# Patient Record
Sex: MALE | Race: WHITE
[De-identification: names, ages, dates, MRNs, and addresses within clinical notes are randomized per-mention and may not be internally consistent; named-entity substitution may affect disease eponyms.]

---

## 2018-11-28 NOTE — CPEKG
Test Reason : OPEN

Blood Pressure : ***/*** mmHG

Vent. Rate : 080 BPM     Atrial Rate : 124 BPM

   P-R Int : 200 ms          QRS Dur : 086 ms

    QT Int : 399 ms       P-R-T Axes : 000 050 251 degrees

   QTc Int : 461 ms

 

Atrial fibrillation

LVH with secondary repolarization abnormality

Anterior Q waves, possibly due to LVH

 

No significant change from February 6, 2017.

Confirmed by Nabeel Lester ([id number]) on 11/28/2018 9:14:32 AM

 

Referred By:             Confirmed By:Nabeel Lester

## 2018-11-28 NOTE — CPIP
DATE OF PROCEDURE:  11/28/2018



INDICATIONS FOR PROCEDURE:  Severe symptomatic aortic stenosis.



PROCEDURES:  

1.  Nonselective left groin sheathogram.  

2.  Right heart catheterization with Meriden-Mansoor catheter. 

3.  Bilateral coronary angiography. 

4.  Abdominal aortogram.



HISTORY:  Briefly this is an 83-year-old male with history of worsening shortness of breath, known cr
itical aortic stenosis.  The patient has been seen by CT surgery and deemed to be a suitable candidat
e for transfemoral TAVR.



DESCRIPTION OF PROCEDURE:  After informed consent was obtained,the patient was brought to [facility] where the left groin was prepped and draped in the usual sterile fashion. Using Cloudscaling
ne, a short 6-Solomon Islander sheath in the left common femoral artery verified angiographically. Of note, the
 femoral angiogram showed severe tortuosity of the proximal common femoral artery, distal external il
iac artery.  The 7-Solomon Islander sheath was inserted into the left common vein.  A right heart catheterizati
on was then obtained with a Meriden-Mansoor catheter.  Wedge pressure was a mean of 12, AA of 14, VA 16, PA
 pressure systolic 31, diastolic 12, mean of 21.  RV pressure systolic 30, diastolic 1 end of 6.  RA 
pressure mean of 5, AA of 7, V-wave 6.  Cardiac output was measured to be 4.2 by Reece with a cardiac 
index of 2.4.  AO sat was 88%, with a PA sat of 63%.  Meriden-Mansoor catheter was removed.  



At this time we decided to change this 6-Solomon Islander sheath in the left groin to a long 25 cm sheath given
 the excessive tortuosity.  A 0.035 wire was gently advanced into the ascending aorta.  A JR4 cathete
r was then advanced across this wire.  Once this was performed, the wire was taken out.  An Amplatz s
uperstiff wire was then advanced. After the Amplatz superstiff wire was advanced, there was clear str
aightening of the vessel.  The short 6-Solomon Islander sheath was removed.  A 6-Solomon Islander 25 cm sheath was then p
laced into the left femoral artery access.  After this was performed, a JR4 catheter was then advance
d on the Amplatz superstiff wire to the right coronary artery.  Images of the right coronary artery r
evealed normal ostial RCA, normal ostia with just mild plaque in its proximal aspect.  Mid distal RCA
 was apparently healthy, free of disease.  RP and RPLS appeared to be healthy and free of disease. Af
ter these images were obtained.  The JR4 catheter was removed.  A JL4 catheter was advanced to the le
ft coronary artery.  Imaging of the left coronary artery revealed 20% ostial left main, with 30% dist
al left main.  The left circumflex artery was a large vessel, giving off multiple small marginal janes
smith were healthy and free of disease.  There was a ramus intermedius which was healthy and free of d
isease.  The LAD was a long vessel which wrapped around the apex.  The LAD had mid body area in the m
id portion of approximately 30% to 40% disease, distally appeared to be widely patent.  Small diagona
l arteries coming off the LAD appeared to be widely patent.  After the images were obtained, the JL4 
catheter was removed.  



A JR4 pigtail catheter advanced to the ascending aorta.  Abdominal aortogram was obtained which showe
d widely patent distal aorta, widely patent common external internal iliac arteries.  Of note, there 
was less tortuosity of the right common femoral takeoff compared to the left.  Pigtail catheter was r
emoved over an 0.035 wire.  Left groin was closed with manual pressure.  Patient tolerated the proced
ure well with no complications.



IMPRESSION:  

1.  Noncritical coronary artery disease mainly in the noncritical coronary disease mainly in the left
 main and mid left anterior descending area.  

2.  Normal right coronary artery.  

3.  Low normal cardiac output.

4.  Patent aortoiliac conduit with tortuous distal vessels.



PLAN:  The patient will have sheaths DC'd with manual pressure. Three hours bed rest. Will have CTs o
f the chest, abdomen, and pelvis, as well as carotid ultrasound.  Plan for TAVR in the next 2-3 weeks
.





Job #:  [id number]

## 2018-12-17 ENCOUNTER — HOSPITAL ENCOUNTER (INPATIENT)
Dept: HOSPITAL 80 - FCATH | Age: 83
LOS: 2 days | Discharge: HOME | DRG: 267 | End: 2018-12-19
Attending: INTERNAL MEDICINE | Admitting: INTERNAL MEDICINE
Payer: COMMERCIAL

## 2018-12-17 DIAGNOSIS — Z79.01: ICD-10-CM

## 2018-12-17 DIAGNOSIS — I10: ICD-10-CM

## 2018-12-17 DIAGNOSIS — I35.0: Primary | ICD-10-CM

## 2018-12-17 DIAGNOSIS — I48.0: ICD-10-CM

## 2018-12-17 DIAGNOSIS — Z00.6: ICD-10-CM

## 2018-12-17 DIAGNOSIS — Z94.4: ICD-10-CM

## 2018-12-17 LAB
INR PPP: 1.07 (ref 0.83–1.16)
PROTHROMBIN TIME: 14.1 SEC (ref 12–15)

## 2018-12-17 PROCEDURE — C1769 GUIDE WIRE: HCPCS

## 2018-12-17 PROCEDURE — 02RF38Z REPLACEMENT OF AORTIC VALVE WITH ZOOPLASTIC TISSUE, PERCUTANEOUS APPROACH: ICD-10-PCS | Performed by: INTERNAL MEDICINE

## 2018-12-17 PROCEDURE — C1894 INTRO/SHEATH, NON-LASER: HCPCS

## 2018-12-17 PROCEDURE — C1760 CLOSURE DEV, VASC: HCPCS

## 2018-12-17 RX ADMIN — PANTOPRAZOLE SODIUM SCH MG: 40 TABLET, DELAYED RELEASE ORAL at 19:52

## 2018-12-17 RX ADMIN — TACROLIMUS SCH MG: 1 CAPSULE ORAL at 19:52

## 2018-12-17 RX ADMIN — METOPROLOL TARTRATE SCH MG: 50 TABLET, FILM COATED ORAL at 19:52

## 2018-12-17 RX ADMIN — WARFARIN SODIUM SCH MG: 2.5 TABLET ORAL at 16:37

## 2018-12-17 NOTE — PDANEPAE
ANE History of Present Illness





82 yo for tavr





ANE Past Medical History





- Cardiovascular History


Hx Hypertension: Yes


Hx Arrhythmias: Yes


Hx Chest Pain: No


Hx Coronary Artery / Peripheral Vascular Disease: Yes


Hx CHF / Valvular Disease: Yes


Hx Palpitations: No





- Pulmonary History


Hx COPD: No


Hx Asthma/Reactive Airway Disease: No


Hx Recent Upper Respiratory Infection: No


Hx Oxygen in Use at Home: No


Hx Sleep Apnea: No





- Endocrine History


Hx Diabetes: No





ANE Review of Systems


Review of Systems: 








- Exercise capacity


METS (RN): 3 METS





ANE Patient History





- Allergies


Allergies/Adverse Reactions: 








No Known Allergies Allergy (Verified 11/21/18 09:35)


 








- Home Medications


Home medications: home medication list seen and reviewed


Home Medications: 








Cyanocobalamin [Vitamin B12 (*)] 1,000 mcg PO DAILY 02/06/17 [Last Taken 12/16/ 18 08:00]


FLUDROCORTISONE ACETATE 0.1 mg PO DAILY 02/06/17 [Last Taken 12/16/18 08:00]


Levothyroxine [Synthroid 88 mcg (*)] 88 mcg PO DAILY06 02/06/17 [Last Taken 12/ 16/18 06:00]


Metoprolol Tartrate [Lopressor 50 mg (*)] 50 mg PO BID 02/06/17 [Last Taken 12/ 16/18 17:00]


Tacrolimus [Prograf] 1 mg PO BID 02/06/17 [Last Taken 12/16/18 17:00]


Carbamide Peroxide [Debrox Ear drops (*)] 5 drop EACHEAR BID PRN 11/21/18 [Last 

Taken 11/28/18]


Lisinopril [Zestril 10 mg (*)] 10 mg PO DAILY 11/21/18 [Last Taken 12/16/18 08:

00]


Polyethylene Glycol 3350 [Miralax 17 gm (*)] 17 gm PO DAILY 11/21/18 [Last 

Taken 12/16/18 08:00]


Warfarin Sodium [Coumadin 2.5MG (*)] 2.5 mg PO DAILY16 11/21/18 [Last Taken 12/ 12/18 16:00]


amLODIPine BESYLATE [Norvasc 10 mg (*)] 10 mg PO DAILY 11/21/18 [Last Taken 12/ 16/18 08:00]








- NPO status


NPO Status: no food or drink >8 hours





- Anes Hx


Anes Hx: no prior problems





- Smoking Hx


Smoking Status: Never smoked





ANE Labs/Vital Signs





- Vital Signs


Height: 5 ft 6.93 in


Weight: 63.5 kg





ANE Physical Exam





- Airway


Neck exam: FROM


Mallampati Score: Class 2


Mouth exam: normal dental/mouth exam





- Pulmonary


Pulmonary: no respiratory distress





- Cardiovascular


Cardiovascular: regular rate and rhythym





- ASA Status


ASA Status: III





ANE Anesthesia Plan


Anesthesia Plan: MAC


Lines/Monitors: central line

## 2018-12-17 NOTE — GOP
DATE OF OPERATION:  12/17/2018



SURGEON:  Elliot Liu MD



PREOPERATIVE DIAGNOSIS:  Severe symptomatic aortic stenosis.



POSTOPERATIVE DIAGNOSIS:  Severe symptomatic aortic stenosis.



PROCEDURE PERFORMED:  Transcatheter aortic valve replacement using a 34 mm Evolut R device via right 
femoral approach.



FINDINGS:  





INDICATIONS:  The patient is an 83-year-old gentleman with progressive dyspnea on exertion.  He was f
ound to have severe symptomatic aortic stenosis.  He has normal coronaries.  He was recommended to [facility] transcatheter aortic valve replacement.



DESCRIPTION OF PROCEDURE:  Patient was taken to the cath lab, placed on the table in the supine posit
ion.  After administration of monitored anesthesia and sterile prep and drape and IV antibiotics, we 
began by anesthetizing both groins.  Ultimately, the 16-Nauruan sheath was placed on the right side pe
rcutaneously with two pre-close devices having been deployed prior to sheath insertion.  On the left 
side, 8-Nauruan sheath was also placed for monitoring blood pressure and positioning of the pigtail.  
Once the sheaths were in place, TVT was tested and found to be functioning properly.  We then heparin
ized the patient and then crossed the valve.  With myself in position 1 and Dr. Kamara in position 2
, we deployed the 34 mm valve across the native valve while pacing at a rate of approximately 100.  W
e did pre-dilate this because of the heavy calcification of the valve.  Once the valve was deployed, 
we were quite happy with the depth.  The leak was mild on echo, and the valve was next fully deployed
.  The device and sheath were then removed from the right side, and these were closed using the pre-c
lose technique, and the 8-Nauruan sheath was also removed and closed with a closure device.  The prota
mine was administered.  The patient tolerated the procedure well and was returned to recovery area in
 stable condition.



CARDIOLOGIST:  Keith Kamara MD.





Job #:  [id number]

## 2018-12-17 NOTE — PDMN
Medical Necessity


Medical necessity: MCG  aortic valve replacement, transcatheter  3 days  

[facility] INPT only  OP: transfemoral TAVR

## 2018-12-17 NOTE — CPIP
DATE OF PROCEDURE:  12/17/2018



CO-SURGEONS:  Dr. Elliot Liu, Dr. Sarah Jenkins, Dr. James Mae.



PROCEDURES:  

1.  Nonselective left groin sheathogram.

2.  Nonselective right groin sheathogram.

3.  Balloon aortic valvuloplasty using 23 x 60 balloon.

4.  Placement of Medtronic CoreValve Evolute R 34 mm valve via the transfemoral route.



HISTORY:  Briefly, this is an 83-year-old male with history of critical aortic symptomatic aortic lakshmi
nosis.  The patient was deemed to be a suitable candidate for transfemoral TAVR by 2 separate CT surg
eons.



DESCRIPTION OF PROCEDURE:  After informed consent, the patient was brought to [facility] where the patient w
as sedated with MAC.  The patient was administered 2 g of Ancef prior to the case.  Right IJ line was
 placed with temporary pacemaker wire placed in right ventricle and capture being assessed.  8-Niuean
 sheath in the left common femoral artery verified angiographically.  6-Niuean sheath in the right fe
moral artery verified angiographically, upsized to a 16-Niuean sheath after double Perclose placement
.  



Valve was crossed with AL1 catheter, straight stiff Glidewire, switched out for a Confida wire.  At t
his time, a balloon aortic valvuloplasty commenced with a 23 x 60 balloon.  After this was performed 
under a pacing rate of 180 beats per minute, the balloon was removed.  We then proceeded with placeme
nt of a Medtronic CoreValve Evolute 34 R.  This was deployed successfully.  



After deployment, there was noted to be mild AI; however, given the fact that the deployment was slig
htly high, we decided that prior to doing any BAV, we would actually put a pigtail back into the LV f
or simultaneous hemodynamics. The hemodynamics showed virtually no gradient between the LV and FA wit
h only an EDP of approximately 60 mmHg.  Given this low EDP, as well as the excellent gradient that w
as present, we decided no post BAV would be needed.  The pigtail catheter was removed over the 035 wi
re.  The right groin was closed with double Perclose.  The left groin was closed with an 8-Niuean Ang
io-Seal.  Patient tolerated the procedure well with no complications.



IMPRESSION:  Successful placement of Medtronic CoreValve Evolute R 34 by the transfemoral route.



PLAN:  The patient will be admitted to PCU.  Further orders following clinical course.





Job #:  [id number]

## 2018-12-17 NOTE — PDPROPOC
Sedation Plan of Care


Sedation Plan of Care: mental status noted, patient educated of risks, benefits

, alternatives, patient can tolerate sedation


ASA Classification: ASA 2


Planned drugs: other


Mallampati Score: Class 2


Mallampati Reference Image: 





Patient passed 3-3-2 rule?: Yes

## 2018-12-18 LAB
INR PPP: 1.2 (ref 0.83–1.16)
PLATELET # BLD: 151 10^3/UL (ref 150–400)
PROTHROMBIN TIME: 15.4 SEC (ref 12–15)

## 2018-12-18 RX ADMIN — TACROLIMUS SCH MG: 1 CAPSULE ORAL at 08:57

## 2018-12-18 RX ADMIN — LISINOPRIL SCH MG: 10 TABLET ORAL at 08:57

## 2018-12-18 RX ADMIN — TACROLIMUS SCH MG: 1 CAPSULE ORAL at 20:10

## 2018-12-18 RX ADMIN — METOPROLOL TARTRATE SCH MG: 50 TABLET, FILM COATED ORAL at 08:57

## 2018-12-18 RX ADMIN — FLUDROCORTISONE ACETATE SCH MG: 0.1 TABLET ORAL at 08:57

## 2018-12-18 RX ADMIN — PANTOPRAZOLE SODIUM SCH MG: 40 TABLET, DELAYED RELEASE ORAL at 20:10

## 2018-12-18 RX ADMIN — POLYETHYLENE GLYCOL 3350 SCH: 17 POWDER, FOR SOLUTION ORAL at 08:58

## 2018-12-18 RX ADMIN — PANTOPRAZOLE SODIUM SCH MG: 40 TABLET, DELAYED RELEASE ORAL at 08:57

## 2018-12-18 RX ADMIN — CYANOCOBALAMIN TAB 1000 MCG SCH MCG: 1000 TAB at 08:57

## 2018-12-18 RX ADMIN — LEVOTHYROXINE SODIUM SCH MCG: 0.09 TABLET ORAL at 05:09

## 2018-12-18 RX ADMIN — WARFARIN SODIUM SCH MG: 2.5 TABLET ORAL at 17:22

## 2018-12-18 RX ADMIN — METOPROLOL TARTRATE SCH MG: 50 TABLET, FILM COATED ORAL at 20:10

## 2018-12-18 NOTE — ECHO
[email or web address]





[address] [address] 

Main: [phone] 



Fax: 



Transthoracic Echocardiogram 

Name:            SILVIANO VILLALBA                             MR#:

[id number]

Study Date:      2018                            Study Time:

10:01 AM

YOB: 1935                            Age:

83 year(s)

Height:            ( )                                 Weight:

( )

BSA:                                                   Gender:

Male

Examination:     Limited Echo                          Indication:

TAVR

Image Quality:                                         Contrast: 

Requested by:    Keith Kamara                      BP:

/

Heart Rate:                                            Rhythm: 

Indication:      TAVR 



Procedure Staff 

Ultrasound Technician:   Kristen Prasad RDCS 

Reading Physician:       Sarah Jenkins MD 

Requesting Provider:     Keith Kamara 



Conclusions:          Normal global systolic LV function.  

Post TAVR: AV max PG is 12mmHG. AV mean PG is 5mmHG. JUAN DIEGO 2.53cm. 2

separate jets of

mild/moderate AI..  

No pericardial effusion. 



Measurements: 

Chambers                   Valvular Assessment AV/MV          Valvular

Assessment TV/PV



Normal                                Normal

Normal

Name         Value   Range             Name        Value Range

Name          Value Range

LVOTd        2.3 cm  2.3 cm mm             AV meanP mmHg ( - )  



JUAN DIEGO (VTI):  2.4 cm ( - )  

AR (PHT):   606 ms ( - )  



Continued Measurements: 

Valvular Assessment AV/MV  



Name                   Value  

AR Vmax:             2.44 cm/s   



Findings:             Left Ventricle: 

Mild concentric LV hypertrophy. Normal global systolic LV function.  

Mitral Valve: 

Mild mitral valve regurgitation is present.  

Aortic Valve: 

Post TAVR: AV max PG is 12mmHG. AV mean PG is 5mmHG. JUAN DIEGO 2.53cm. 2

separate jets of

mild/moderate AI..  

Pericardium: 



Patient: SILVIANO VILLALBA                          MRN: [id number]

Study Date: 2018   Page 1 of 2

10:01 AM 









No pericardial effusion.  

Exam Comments: 

There was successful implantation of the percutaneous core valve in

the aortic position. There is no

compromise of the mitral valve. There is preserved LV systolic

function. .







Electronically signed by Sarah Jenkins MD on 2018 at 04:27 PM 

(No Signature Object) 



Patient: SILVIANO VILLALBA                          MRN: [id number]

Study Date: 2018   Page 2 of 2

10:01 AM 







[id number]

## 2018-12-18 NOTE — ASMTCMCOM
CM Note

 

CM Note                       

Notes:

Spoke with pt's RN, pt and pt's son in the room. Pt lives independently at [facility].  Pt 

admitted for TAVR. Therapy evals pending. Pt would like to discharge home to son's home for a day 

or two and then back to his apartment independently. CM to follow. 



D/C Plan: Home with son independently v [facility]

 

Date Signed:  12/18/2018 02:39 PM

Electronically Signed By:Roxanne Robledo

## 2018-12-18 NOTE — PDCARPN
Cardiology Progress Note


Chief Complaint: 





SOB


Assessment/Plan: 


Assessment:


s/p TAVR























Plan:





12/18/18 06:58


doing well


OOB


IS


check echo


Subjective: 





doing well


Reviewed/Discussed With: multidisciplinary team


Time Spent with Patient: greater than 25 minutes


Time Spent with Patient: Greater than 25 minutes spent on this patients care, 

greater than 50% of time spent counseling, educating, and coordinating care 

regarding the above mentioned plan.


Objective: 





 Vital Signs (8 Hrs)











  Temp Pulse Resp BP Pulse Ox


 


 12/18/18 04:22  36.9 C  79  14  141/65 H  94


 


 12/17/18 22:59  37.2 C  72  12  135/81 H  95








 Intake/Output (24 Hrs)











 12/17/18 12/18/18 12/19/18





 05:59 05:59 05:59


 


Intake Total  1490 


 


Output Total  1075 


 


Balance  415 


 


Intake:   


 


  Oral (ml)  490 


 


  IV Intake (ml)  1000 


 


Output:   


 


  Urine (ml)  1075 


 


    Urinal  1075 


 


Other:   


 


  Weight  63.5 kg 


 


  Number of Voids   


 


    Urinal  2 











Result Diagrams: 


 12/18/18 03:36





 12/18/18 03:36





- Physical Exam


Constitutional: healthy appearing


Eyes: PERRL


Ears, Nose, Mouth, Throat: moist mucous membranes


Cardiovascular: irregularly irregular


Peripheral Pulses: 1+: femoral (R), femoral (L)


Respiratory: clear to auscultate bilat


Gastrointestinal: normoactive bowel sounds


Genitourinary: no suprapubic tenderness


Skin: no rashes


Musculoskeletal: no muscular tenderness


Neurologic: AAOx3


Psychiatric: cooperative





ICD10 Worksheet


Patient Problems: 


 Problems











Problem Status Onset


 


Anemia due to GI blood loss Acute  


 


Upper GI bleed Acute  


 


Weakness Acute

## 2018-12-18 NOTE — ECHO
[email or web address]





[address] [address] 

Main: [phone] 



Fax: 



Transthoracic Echocardiogram 

Name:             SILVIANO VILLALBA                                MR#:

[id number]

Study Date:       2018                               Study Time:

10:05 AM

YOB: 1935                               Age:

83 year(s)

Height:           167.6 cm (66 in.)                        Weight:

63.05 kg (139 lb.)

BSA:              1.71 m2                                  Gender:

Male

Examination:      Echo                                     Indication:

Post TAVR

Image Quality:                                             Contrast: 

Requested by:     Keith Kamara                         BP:

104 mmHg/70 mmHg

Heart Rate:                                                Rhythm: 

Indication:       Post TAVR 



Procedure Staff 

Ultrasound Technician:   Ford Olmstead RDCS 

Reading Physician:       Keith Kamara MD 

Requesting Provider: 



Conclusions:           Mild concentric LV hypertrophy.  

Global hypercontractility of the left ventricle.  

EF is 72 %.  

Mild mitral valve regurgitation is present.  

There is a percutaneous CoreValve in the aortic position. There are

two jets of mild AI. There is no

compromise of the mitral valve with normal EF .  

Mild to moderate tricuspid valve regurgitation. 



Measurements: 

Chambers                     Valvular Assessment AV/MV

Valvular Assessment TV/PV



Normal                                    Normal

Normal

Name         Value      Range              Name         Value Range

Name           Value Range

IVSd (2D):   1.0 cm (0.6 cm-1.1                AV Vmax:     1.40 m/s

(1 m/s-1.7        TR Vmax:       3.02 mm/s ( - )



cm)                                   m/s)             TR PGmax:

36 mmHg ( - )

LVDd (2D):   4.3 cm     (4.2 cm-5.9            AV maxP mmHg ( -

)           syst. PAP: 41 mmHg   ( - )



cm)                AV meanP mmHg ( - )           PV Vmax:

0.78 m/s (0.6 m/s-0.9

LVDs (2D):   2.5 cm     (2.1 cm-4              LVOT Vmax:   1.03 m/s

(0.7 m/s-1.1                           m/s)



cm)                                   m/s)             PV PGmax:

2  mmHg ( - )

LVPWd (2D):  1.0 cm     (0.6 cm-1              JUAN DIEGO (Vmax):  3.1 cm2 (

- )



cm)                JUAN DIEGO (VTI):   3.5 cm ( - )  

LVOTd        2.3 cm     2.3 cm mm              AR (PHT):    569 ms ( -

)

LVEF (2D):   72         (>=54 %)           MV E Vmax:   1.17 m/s ( - )





Continued Measurements: 

Chambers                     Valvular Assessment AV/MV

Valvular Assessment TV/PV



Name                     Value             Name

Value      Name                     Value

LA Volume:              80 ml              MV E/E' Septal:

23.10      CVP (est.):             5 mmHg

LA Volume Index:        46.8 ml/m2         MV E/E' Lateral:

18.50



Patient: SILVIANO VILLALBA                            MRN: [id number]

Study Date: 2018   Page 1 of 2

10:05 AM 









AR Vmax: 2.47 cm/s   



Findings:              Left Ventricle: 

Normal size left ventricle. Mild concentric LV hypertrophy. Global

hypercontractility of the left

ventricle. EF is 72 %. No regional wall motion abnormality. Grade 1

diastolic dysfunction (abnormal

relaxation).  

Right Ventricle: 

Normal size right ventricle. Normal RV function.  

Left Atrium: 

The left atrium is moderately to severely dilated.  

Right Atrium: 

The right atrium is moderately dilated.  

Mitral Valve: 

The mitral valve is normal in appearance. Mild mitral valve

regurgitation is present.

Aortic Valve: 

There is a percutaneous CoreValve in the aortic position. There are

two jets of mild AI. There is no

compromise of the mitral valve with normal EF .  

Tricuspid Valve: 

The tricuspid valve appears normal. Mild to moderate tricuspid valve

regurgitation. The pulmonary

artery pressure is mildly increased.  

Pulmonic Valve: 

The pulmonic valve is normal in appearance and function.  

Aorta: 

The aorta is normal.  

Pericardium: 

No pericardial effusion. 







Electronically signed by Keith Kamara MD on 2018 at 12:33 PM



(No Signature Object) 



Patient: SILVIANO VILLALBA                            MRN: [id number]

Study Date: 2018   Page 2 of 2

10:05 AM 







[id number]

## 2018-12-19 VITALS — SYSTOLIC BLOOD PRESSURE: 136 MMHG | DIASTOLIC BLOOD PRESSURE: 69 MMHG

## 2018-12-19 LAB
INR PPP: 1.25 (ref 0.83–1.16)
PLATELET # BLD: 113 10^3/UL (ref 150–400)
PROTHROMBIN TIME: 15.9 SEC (ref 12–15)

## 2018-12-19 RX ADMIN — PANTOPRAZOLE SODIUM SCH MG: 40 TABLET, DELAYED RELEASE ORAL at 09:34

## 2018-12-19 RX ADMIN — LEVOTHYROXINE SODIUM SCH MCG: 0.09 TABLET ORAL at 03:53

## 2018-12-19 RX ADMIN — LISINOPRIL SCH MG: 10 TABLET ORAL at 09:34

## 2018-12-19 RX ADMIN — POLYETHYLENE GLYCOL 3350 SCH GM: 17 POWDER, FOR SOLUTION ORAL at 09:33

## 2018-12-19 RX ADMIN — FLUDROCORTISONE ACETATE SCH MG: 0.1 TABLET ORAL at 10:48

## 2018-12-19 RX ADMIN — CYANOCOBALAMIN TAB 1000 MCG SCH MCG: 1000 TAB at 10:48

## 2018-12-19 RX ADMIN — METOPROLOL TARTRATE SCH MG: 50 TABLET, FILM COATED ORAL at 10:48

## 2018-12-19 RX ADMIN — TACROLIMUS SCH MG: 1 CAPSULE ORAL at 09:34

## 2018-12-19 NOTE — GDS
DISCHARGE DIAGNOSIS:  Aortic stenosis.



HOSPITAL COURSE:  Briefly, this is 83-year-old male with history of liver transplant, severe symptoma
tic aortic stenosis, atrial fibrillation who was admitted electively for transfemoral TAVR on 12/17/2
018.  The patient underwent successful to transfemoral TAVR on 12/17/2018, with Medtronic CoreValve E
volut 34 mm valve.  



Postprocedure, the patient has done very well.  Echocardiogram post-procedure shows normally function
ing EF with normally functioning valve with mild paravalvular leak.  The patient will be discharged h
ome this morning with his home medications, including his home Coumadin dose.  Given the fact the pat
ient is subtherapeutic still, we will start the patient on Lovenox 60 mg b.i.d. for 4 doses as a brid
ge.  The patient will follow up in the office in approximately 1 week's time.





Job #:  [id number]

## 2018-12-19 NOTE — CPEKG
Test Reason : OPEN

Blood Pressure : ***/*** mmHG

Vent. Rate : 075 BPM     Atrial Rate : 000 BPM

   P-R Int : 052 ms          QRS Dur : 098 ms

    QT Int : 386 ms       P-R-T Axes : 000 017 229 degrees

   QTc Int : 432 ms

 

Atrial fibrillation

LVH with secondary repolarization abnormality

Anterior Q waves, possibly due to LVH

 

Confirmed by Dean Lynn ([id number]) on 12/19/2018 1:54:11 PM

 

Referred By:             Confirmed By:Dean yLnn

## 2018-12-19 NOTE — PDCARPN
Cardiology Progress Note


Chief Complaint: 





SOB


Assessment/Plan: 


Assessment:


s/p TAVR























Plan:





12/18/18 06:58


doing well


OOB


IS


check echo


12/19/18 07:38


Stable


OOB


d/c home today


f/u next week


lovenox bridge





Subjective: 





doing well


Reviewed/Discussed With: multidisciplinary team


Time Spent with Patient: greater than 25 minutes


Time Spent with Patient: Greater than 25 minutes spent on this patients care, 

greater than 50% of time spent counseling, educating, and coordinating care 

regarding the above mentioned plan.


Objective: 





 Vital Signs (8 Hrs)











  Temp Pulse Resp BP Pulse Ox


 


 12/19/18 07:11  36.6 C  86  12  136/69 H  93


 


 12/19/18 04:00  37.1 C  79  16  117/70  95


 


 12/19/18 00:00  37.2 C  76  14  120/68  92








 Intake/Output (24 Hrs)











 12/18/18 12/19/18 12/20/18





 05:59 05:59 05:59


 


Intake Total 1490 1170 


 


Output Total 1075 100 


 


Balance 415 1070 


 


Intake:   


 


  Oral (ml) 490 1170 


 


  IV Intake (ml) 1000  


 


Output:   


 


  Urine (ml) 1075 100 


 


    Urinal 1075 100 


 


Other:   


 


  Weight 63.5 kg  


 


  Number of Voids   


 


    Toilet  2 


 


    Urinal 2  


 


  Number of Stools   


 


    Toilet  1 











Result Diagrams: 


 12/19/18 03:55





 12/19/18 03:55





- Physical Exam


Constitutional: no apparent distress


Eyes: PERRL


Ears, Nose, Mouth, Throat: moist mucous membranes


Cardiovascular: systolic murmur, irregularly irregular


Peripheral Pulses: 1+: femoral (R), femoral (L)


Respiratory: clear to auscultate bilat


Gastrointestinal: normoactive bowel sounds


Genitourinary: no suprapubic tenderness


Skin: no rashes


Musculoskeletal: no muscular tenderness


Neurologic: AAOx3


Psychiatric: cooperative





ICD10 Worksheet


Patient Problems: 


 Problems











Problem Status Onset


 


Anemia due to GI blood loss Acute  


 


Upper GI bleed Acute  


 


Weakness Acute

## 2018-12-27 ENCOUNTER — HOSPITAL ENCOUNTER (OUTPATIENT)
Dept: HOSPITAL 80 - FED | Age: 83
Setting detail: OBSERVATION
LOS: 2 days | Discharge: HOME HEALTH | End: 2018-12-29
Attending: INTERNAL MEDICINE | Admitting: INTERNAL MEDICINE
Payer: COMMERCIAL

## 2018-12-27 DIAGNOSIS — I25.10: ICD-10-CM

## 2018-12-27 DIAGNOSIS — I12.9: ICD-10-CM

## 2018-12-27 DIAGNOSIS — Z66: ICD-10-CM

## 2018-12-27 DIAGNOSIS — Z95.2: ICD-10-CM

## 2018-12-27 DIAGNOSIS — N18.9: ICD-10-CM

## 2018-12-27 DIAGNOSIS — I48.2: ICD-10-CM

## 2018-12-27 DIAGNOSIS — Z94.4: ICD-10-CM

## 2018-12-27 DIAGNOSIS — Z79.01: ICD-10-CM

## 2018-12-27 DIAGNOSIS — Z98.890: ICD-10-CM

## 2018-12-27 DIAGNOSIS — E03.9: ICD-10-CM

## 2018-12-27 DIAGNOSIS — I50.30: Primary | ICD-10-CM

## 2018-12-27 DIAGNOSIS — I35.1: ICD-10-CM

## 2018-12-27 DIAGNOSIS — R53.83: ICD-10-CM

## 2018-12-27 DIAGNOSIS — Z87.19: ICD-10-CM

## 2018-12-27 LAB
INR PPP: 1.68 (ref 0.83–1.16)
PLATELET # BLD: 203 10^3/UL (ref 150–400)
PROTHROMBIN TIME: 19.9 SEC (ref 12–15)

## 2018-12-27 PROCEDURE — 99285 EMERGENCY DEPT VISIT HI MDM: CPT

## 2018-12-27 PROCEDURE — G0378 HOSPITAL OBSERVATION PER HR: HCPCS

## 2018-12-27 PROCEDURE — 96374 THER/PROPH/DIAG INJ IV PUSH: CPT

## 2018-12-27 PROCEDURE — 96376 TX/PRO/DX INJ SAME DRUG ADON: CPT

## 2018-12-27 PROCEDURE — 71045 X-RAY EXAM CHEST 1 VIEW: CPT

## 2018-12-27 PROCEDURE — 70450 CT HEAD/BRAIN W/O DYE: CPT

## 2018-12-27 PROCEDURE — 93990 DOPPLER FLOW TESTING: CPT

## 2018-12-27 PROCEDURE — 93005 ELECTROCARDIOGRAM TRACING: CPT

## 2018-12-27 PROCEDURE — 97116 GAIT TRAINING THERAPY: CPT

## 2018-12-27 PROCEDURE — 97161 PT EVAL LOW COMPLEX 20 MIN: CPT

## 2018-12-27 PROCEDURE — 93306 TTE W/DOPPLER COMPLETE: CPT

## 2018-12-27 RX ADMIN — TACROLIMUS SCH MG: 1 CAPSULE ORAL at 20:33

## 2018-12-27 RX ADMIN — PANTOPRAZOLE SODIUM SCH MG: 40 TABLET, DELAYED RELEASE ORAL at 20:33

## 2018-12-27 NOTE — GHP
DATE OF ADMISSION:  12/27/2018



CHIEF COMPLAINT:  Shortness of breath and fatigue over the last week.



HISTORY OF PRESENT ILLNESS:  The patient is an 83-year-old male who is known to 
our practice.  His primary cardiologist is Dr. Bret Maza, his structural 
heart cardiologist is Dr. Kamara.  Patient with significant past history that 
includes chronic atrial fibrillation, chronic renal insufficiency, severe 
aortic stenosis in which he underwent a TAVR procedure on December 17th of this 
year, discharged on December 19th (Medtronic CoreValve Evolut R 34 mm), history 
of liver transplant, long-acting immuno/anti-rejection medication, and 
hypertension.  The patient was brought in today by his son, patient stating 
that over the last week, since hospital discharge, he has been noticing mild 
shortness of breath, feels that this has worsened as time progressed.  He has 
also noted occasional episodes of lightheadedness with blurred vision that 
happen spontaneously and then dissipates within a few minutes, usually 
happening when he is standing.  He reports no chest pain or pressure.  
Reporting no recent fevers, chills, or night sweats.  Does state some mild 
orthopnea, but denies any PND or edema.  Reports no palpitations, near syncope, 
or syncopal events.  Denies any symptoms suggestive of TIA or CVA.  Reports no 
hematemesis.  Reports no bleeding issues.  Patient does report ongoing pain at 
bilateral groin site, catheter insertion sites at both groins, but states he 
has had no signs of infection and no drainage.  Due to his ongoing shortness of 
breath, his son did call our office today, and was told to come to the ER for 
further evaluation.  Upon arrival, electrocardiogram was done, noting that the 
patient was in atrial fibrillation while rate controlled, possible LVH.  A 
chest x-ray was also done which did note mild CHF with fluid overload and small 
bilateral pleural effusions.  Laboratory studies were drawn, showing mild anemia
, but not significantly changed from his discharge from TAVR.  He was also 
noted to have a proBNP of 11,400.  Negative troponin at 0.03.



PAST MEDICAL HISTORY:  Includes chronic atrial fibrillation, carotid 
arthrosclerosis, non-flow limiting CAD based off cardiac catheterization done 
in November of this year, chronic renal insufficiency, history of liver 
transplant, hypertension, hypothyroidism, history of GI bleed, significant 
hospitalization for sepsis in 2013, and valvular heart disease, as mentioned 
above.



PAST SURGICAL HISTORY:  Includes liver transplant in July of 2013, multiple ____
______, small-bowel obstruction, and TAVR procedure done on December 17, 2018, 
with a Medtronic CoreValve Evolut R 34 mm implantation.



FAMILY HISTORY:  Patient with reported family history of hypertension.



SOCIAL HISTORY:  He does not use any alcohol.  He lives independently in 
[place].  His son lives locally, in [place].  He is retired.  He has never 
smoked.  He denies any illicit drugs.  He is a .



ALLERGIES:  No known drug allergies.



HOME MEDICATIONS:  Include amlodipine 10 mg p.o. daily, warfarin 2.5 mg p.o. 
daily, Prograf 1 mg p.o. b.i.d., MiraLAX 17 g p.o. daily, Protonix 40 mg p.o. 
b.i.d., metoprolol tartrate 50 mg p.o. b.i.d., lisinopril 10 mg p.o. daily, 
synthroid 88 mcg p.o. daily, __________ 0.1 mg p.o. daily, vitamin B12 at 1000 
mg p.o. daily, Debrox ear drops 5 drops each ear b.i.d. p.r.n.



REVIEW OF SYSTEMS:  A 10-point review of systems done on patient.  All negative 
except as mentioned above.



PHYSICAL EXAMINATION:  GENERAL:  Thin, elderly  male.  He is alert and 
oriented to person, place, time, and situation.  Appears to be under no acute 
distress.  VITAL SIGNS:  Blood pressure of 139/60, heart rate of 70, atrial 
fibrillation on the monitor.  Respirations 18, saturating 90% on room air.  
Temperature of 37.1 degrees Celsius.  HEENT:  Head is normocephalic.  Lips and 
tongue are pink and moist with no signs of cyanosis.  Conjunctivae pink.  NECK:
  Trachea is midline, +2 carotid pulses bilateral, no auscultated bruits.  JVD 
at 5 to 6 cm above the sternal notch at a 45 degree angle.  RESPIRATORY:  Mild 
rales noted in bases bilateral, no rhonchi or wheezing noted.  No accessory 
muscle use.  No intercostal muscle retraction noted.  CARDIAC:  Regular rate, 
irregular rhythm.  S1, S2.  2/6 systolic murmur noted left sternal border.  
ABDOMEN:  Soft, nontender, bowel sounds x4 quadrants, no organomegaly, no 
palpable masses.  SKIN:  Pink, warm, dry, no cyanosis, no peripheral edema.  
VASCULAR:  +2 carotids bilateral, +2 radials bilateral, +2 dorsal pedal and 
posterior tibial pulses bilateral.



DATA REVIEWED:  Laboratories drawn on admission showing WBC of 5.57, hemoglobin 
of 11.4, hematocrit of 34.1, platelet count of 203.  INR noted to be 
subtherapeutic at 1.68.  Sodium 141, potassium 4.1, chloride 112, CO2 of 20, 
BUN 36, creatinine 1.6, glucose 167, calcium 9.3.  Troponin 0.03, proBNP 11,
400. 



Studies:  Electrocardiogram as mentioned above.  Chest x-ray as mentioned 
above.  



Echocardiogram was done showing normal LV size, mild concentric LVH.  EF 
estimated between 70% to 75% with no regional wall motion abnormalities.  
Diastolic dysfunction was present.  LA was moderately to severely dilated.  
Percutaneous core valve in aortic position.  There were 2 AI jets with a 
maximum peak gradient of 8 mmHg and a mean gradient of 4 mmHg.  Mild-to-
moderate AI, mild to moderate TR.  RVSP of 44 mmHg.  No pericardial effusion.



ASSESSMENT/PLAN:  

1.  Diastolic heart failure:  Patient reporting gradual onset of shortness of 
breath over the last 2 weeks.  He has had no significant weight gain, but shows 
up with significant JVD, chest x-ray showing mild CHF with mild pleural 
effusions.  Elevated BNP, Rales noted in bases bilateral on physical 
examination.  At this time, we will admit him for IV diuresis and monitor him 
closely.  Daily ins and outs.  Daily weights.  Initial order of IV Lasix 
ordered in the ER for 40 mg.  

2.  Episodes of lightheadedness:  The patient has been noted to be orthostatic 
in the past.  Will check orthostatic blood pressures upon arrival to the PCU.  
The patient was also noted at times to have heart rates drop down to 30 beats 
per minute in atrial fibrillation, concerning that may be potentially due to 
significant pauses due to his atrial fibrillation.  Would like to decrease his 
beta-blocker dosage of metoprolol, home dosage, to 37.5 mg p.o. b.i.d. 

3.  Chronic atrial fibrillation:  He is currently rate controlled.  As 
mentioned above, pauses noted.  Will decrease metoprolol home dosage to 37.5 mg 
p.o. b.i.d.  He is on systemic anticoagulation of warfarin.  He is 
subtherapeutic.  Will give him an additional dose of warfarin today, will 
monitor INR closely.  

4.  Coronary artery disease, recent cardiac catheterization showing non-flow 
limiting disease, mostly in left main and left anterior descending, done in 
November of this year.  He is currently not on antiplatelet therapy due to 
being on full anticoagulation.  He is also not on statin therapy due to history 
of liver transplant.  Will continue to work on risk reduction with blood 
pressure management.

5.  History of liver transplant:  Will plan on getting a liver profile in the 
a.m., continue on home dose of Prograf.

6.  Hypertension:  Blood pressure appears fairly well-controlled on current 
dose of metoprolol and lisinopril.  No changes to lisinopril dosage from home.  
Reduce metoprolol dosage as mentioned above.  Continue to monitor, adjust as 
needed.

7.  Hypothyroidism:  Will resume patient's home Synthroid level.

8.  History of gastrointestinal bleeds:  Will continue patient on home dosage 
of Protonix.

9.  Renal insufficiency:  The patient's creatinine noted to be elevated today 
at 1.6, potentially due to cardiorenal due to heart failure.  Will monitor 
closely with the diuretic therapy.

10.  Code status:  Patient reports he has a DNR

11.  Deep venous thrombosis prophylaxis:  Patient has been ordered CHIARA hose and 
he is on warfarin therapy.





Job #:  [id number]

MTDD

## 2018-12-27 NOTE — ECHO
[email or web address]





[address] [address] 

Main: [phone] 



Fax: 



Transthoracic Echocardiogram 

Name:             SHIRLEY VILLALBA                            MR#:

[id number]

Study Date:       2018                              Study Time:

01:39 PM

YOB: 1935                              Age:

83 year(s)

Height:           170.2 cm (67 in.)                       Weight:

63.5 kg (140 lb.)

BSA:              1.74 m2                                 Gender:

Male

Examination:      Echo                                    Indication:

Dyspnea after TVAR 10 days ago

Image Quality:    Good                                    Contrast: 

Requested by:     Quinton Fields                          BP:

/

Heart Rate:                                               Rhythm: 

Indication:       Dyspnea after TVAR 10 days ago 



Procedure Staff 

Ultrasound Technician:   Kristen Prasad RDCS 

Reading Physician:       Carlitos Brandon MD 

Requesting Provider: 



Conclusions:           Normal size left ventricle.  

Mild concentric LV hypertrophy.  

The ejection fraction is estimated to be 70-75 %.  

No regional wall motion abnormality.  

Diastolic dysfunction is present. .  

The left atrium is moderately to severely dilated.  

There is a percutaneous Core valve in the aortic positon. There are 2

jets of AI. AV max PG is

8mmHG. AV mean PG is 4mmHG..  

Mild to moderate aortic valve regurgitation.  

Mild to moderate tricuspid valve regurgitation.  

RVSP is 44mmHG..  

No pericardial effusion.  

No significant change compared to 2018. 



Measurements: 

Chambers                    Valvular Assessment AV/MV

Valvular Assessment TV/PV



Normal                                   Normal

Normal

Name         Value     Range              Name         Value Range

Name           Value Range

IVSd (2D):   1.2 cm (0.6 cm-1.1               AV Vmax:     1.41 m/s (1

m/s-1.7       TR Vmax:       3.14 mm/s ( - )



cm)                                  m/s)             TR PGmax:

39 mmHg ( - )

LVDd (2D):   4.7 cm    (4.2 cm-5.9            AV maxP mmHg ( -

)          syst. PAP: 44 mmHg  ( - )



cm)                AV meanP mmHg ( - )  

LVDs (2D):   2.8 cm    (2.1 cm-4              LVOT Vmax:   0.90 m/s

(0.7 m/s-1.1



cm)                                  m/s)  

LVPWd (2D):  0.9 cm    (0.6 cm-1              JUAN DIEGO (Vmax):  2.7 cm2 ( -

)



cm)                JUAN DIEGO (VTI):   3.0 cm ( - )  

LVOTd        2.3 cm    2.3 cm mm              MV E Vmax:   1.23 m/s (

- )

LVEF (MOD4): 67 %      (>=55 %)           MV A Vmax:   0.22 m/s ( - )



EF Range:    70-75 %                          MV E/A:      5.59 ( - )





Patient: SHIRLEY VILLALBA                         MRN: [id number]

Study Date: 2018   Page 1 of 2

01:39 PM 









Continued Measurements: 

Chambers                     Valvular Assessment AV/MV

Valvular Assessment TV/PV



Name                      Value           Name

Value    Name                     Value

LADs:                   4.3 cm                MV E/E' Septal:

19.40     CVP (est.):             5 mmHg

LADs Lon.4 cm                MV E/E' Lateral:

20.40

LA Area:                33.7 cm2   

LA Volume:              135 ml   

LA Volume Index:        77.6 ml/m2   



Additional Vessels  



Name                      Value  

Ao Ascendin.1 cm    



Findings:              Left Ventricle: 

Normal size left ventricle. Mild concentric LV hypertrophy. Normal

global systolic LV function. The

ejection fraction is estimated to be 70-75 %. No regional wall motion

abnormality. Diastolic

dysfunction is present. .  

Right Ventricle: 

Normal size right ventricle.  

Left Atrium: 

The left atrium is moderately to severely dilated.  

Right Atrium: 

The right atrium is normal in size.  

Mitral Valve: 

The mitral valve is normal in appearance. Mild mitral valve

regurgitation is present.

Aortic Valve: 

Mild to moderate aortic valve regurgitation. There is a percutaneous

Core valve in the aortic positon.

There are 2 jets of AI. AV max PG is 8mmHG. AV mean PG is 4mmHG..  

Tricuspid Valve: 

The tricuspid valve appears normal. Mild to moderate tricuspid valve

regurgitation. The pulmonary

artery pressure is mildly increased. RVSP is 44mmHG..  

Pulmonic Valve: 

The pulmonic valve is normal in appearance and function. Mild pulmonic

valve regurgitation is noted.



Aorta: 

The aorta is normal.  

Pericardium: 

No pericardial effusion. 







Electronically signed by Carlitos Brandon MD on 2018 at 03:05 PM 

(No Signature Object) 



Patient: SHIRLEY VILLALBA                         MRN: [id number]

Study Date: 2018   Page 2 of 2

01:39 PM 







[id number]

## 2018-12-27 NOTE — CPEKG
Test Reason : OPEN

Blood Pressure : ***/*** mmHG

Vent. Rate : 064 BPM     Atrial Rate : 131 BPM

   P-R Int : 080 ms          QRS Dur : 090 ms

    QT Int : 506 ms       P-R-T Axes : 000 038 -77 degrees

   QTc Int : 522 ms

 

Atrial fibrillation

LVH with secondary repolarization abnormality

Anterior Q waves, possibly due to LVH

Prolonged QT interval

 

Confirmed by Quinton Fields ([id number]) on 12/27/2018 2:14:56 PM

 

Referred By:             Confirmed By:Quinton Fields

## 2018-12-27 NOTE — EDPHY
H & P


Stated Complaint: weak 1w S/P TAVR


Time Seen by Provider: 12/27/18 12:53


HPI/ROS: 





CHIEF COMPLAINT:  Weakness and dyspnea following TAVR procedure approximately 

week ago





HISTORY OF PRESENT ILLNESS:  Patient has history of severe aortic stenosis and 

underwent a TAVR procedure approximately week ago.  He presents to the ED today 

with increasing weakness, dyspnea and blurry vision.  The patient denies any 

fever, cough or congestion.  He denies any medication changes.  The patient 

denies increasing edema, skin rashes or medication changes.  The patient states 

that his symptoms are moderate in nature.  He feels his dyspnea and weakness 

has increased than his pre cardiac procedure baseline.





REVIEW OF SYSTEMS:


A comprehensive 10 point review of systems is otherwise negative aside from 

elements mentioned in the history of present illness.








Source: Patient


Exam Limitations: No limitations





- Personal History


Current Tetanus/Diphtheria Vaccine: Yes


Current Tetanus Diphtheria and Acellular Pertussis (TDAP): Yes





- Medical/Surgical History


Hx Asthma: No


Hx Chronic Respiratory Disease: No


Hx Diabetes: No


Hx Cardiac Disease: Yes


Hx Renal Disease: No


Hx Cirrhosis: No


Hx Alcoholism: No


Hx HIV/AIDS: No


Hx Splenectomy or Spleen Trauma: No


Other PMH: PSH: liver transplant; prostitectomy; T&A; Appy; maxine; abd sx due 

to sepsis;.  PMH: htn, TVAR





- Social History


Smoking Status: Never smoked





- Physical Exam


Exam: 





General Appearance:  Thin elderly male, no acute distress


Eyes:  Pupils equal and round no pallor or injection


ENT, Mouth:  Mucous membranes moist


Respiratory:  There are no retractions, lungs are clear to auscultation


Cardiovascular:  Regular rate and rhythm


Gastrointestinal:  Old surgical incisions noted, abdomen is soft and nontender, 

no masses, bowel sounds normal


Neurological:  A&O, normal motor function, normal sensory exam, normal cranial 

nerves


Skin:  Ecchymosis noted at the left groin with small subcutaneous hematoma


Musculoskeletal:  Neck is supple nontender


Extremities:  symmetrical, full range of motion











Constitutional: 


 Initial Vital Signs











Temperature (C)  36.3 C   12/27/18 12:55


 


Heart Rate  72   12/27/18 12:55


 


Respiratory Rate  16   12/27/18 12:55


 


Blood Pressure  118/61   12/27/18 12:55


 


O2 Sat (%)  92   12/27/18 12:55








 











O2 Delivery Mode               Room Air














Allergies/Adverse Reactions: 


 





No Known Allergies Allergy (Verified 12/27/18 12:54)


 








Home Medications: 














 Medication  Instructions  Recorded


 


Cyanocobalamin [Vitamin B12 (*)] 1,000 mcg PO DAILY 02/06/17


 


FLUDROCORTISONE ACETATE 0.1 mg PO DAILY 02/06/17


 


Levothyroxine [Synthroid 88 mcg 88 mcg PO DAILY06 02/06/17





(*)]  


 


Metoprolol Tartrate [Lopressor 50 50 mg PO BID 02/06/17





mg (*)]  


 


Tacrolimus [Prograf] 1 mg PO BID 02/06/17


 


Pantoprazole Sodium [Protonix 40mg 40 mg PO BID #60 tab 02/08/17





(*)]  


 


Carbamide Peroxide [Debrox Ear 5 drop EACHEAR BID PRN 11/21/18





drops (*)]  


 


Lisinopril [Zestril 10 mg (*)] 10 mg PO DAILY 11/21/18


 


Polyethylene Glycol 3350 [Miralax 17 gm PO DAILY 11/21/18





17 gm (*)]  


 


Warfarin Sodium [Coumadin 2.5MG 2.5 mg PO DAILY16 11/21/18





(*)]  


 


amLODIPine BESYLATE [Norvasc 10 mg 10 mg PO DAILY 11/21/18





(*)]  


 


Enoxaparin [Lovenox 60 MG (*)] 60 mg SC BID #4 syr 12/19/18














Medical Decision Making





- Diagnostics


EKG Interpretation: 





EKG:  Complete interpretation has been separately recorded in the [facility] 

archive.  Summary impression:  Atrial fibrillation, rate 64, LVH


Imaging Results: 


 Imaging Impressions





Chest X-Ray  12/27/18 13:04


Impression:  Mild CHF/fluid overload with small bilateral pleural effusions.











ED Course/Re-evaluation: 





Patient presents the ED with dyspnea, weakness and visual changes one-week 

status post valve replacement.





The patient presents to the ED is noted to be in chronic atrial fibrillation.





Patient is noted to have a slightly elevated creatinine of 1.6.





This chest x-ray does demonstrate evidence of heart failure.





Echocardiogram does demonstrate AI which is unchanged from the postprocedure 

echo.





Consultation was made with Bhanu from the cardiology service.  I have requested 

the patient be evaluated by the cardiology attending in the emergency 

department.





The patient presents to the emergency department with likely diastolic 

dysfunction in the setting of AI following TAVR.





Cardiology has requested IV Lasix.  The patient received a total of 40 mg in 

the ED.





They will admit the patient to the PCU for stabilization.








Differential Diagnosis: 





Differential diagnosis considered includes atrial fibrillation, pericardial 

effusion, valvular dysfunction, myocardial infarction, critical anemia, heart 

failure





- Data Points


Laboratory Results: 


 Laboratory Results





 12/27/18 13:16 





 12/27/18 13:16 





 











  12/27/18 12/27/18 12/27/18





  13:18 13:16 13:16


 


WBC      





    


 


RBC      





    


 


Hgb      





    


 


Hct      





    


 


MCV      





    


 


MCH      





    


 


MCHC      





    


 


RDW      





    


 


Plt Count      





    


 


MPV      





    


 


Neut % (Auto)      





    


 


Lymph % (Auto)      





    


 


Mono % (Auto)      





    


 


Eos % (Auto)      





    


 


Baso % (Auto)      





    


 


Nucleat RBC Rel Count      





    


 


Absolute Neuts (auto)      





    


 


Absolute Lymphs (auto)      





    


 


Absolute Monos (auto)      





    


 


Absolute Eos (auto)      





    


 


Absolute Basos (auto)      





    


 


Absolute Nucleated RBC      





    


 


Immature Gran %      





    


 


Immature Gran #      





    


 


Sodium      141 mEq/L mEq/L





     (135-145) 


 


Potassium      4.1 mEq/L mEq/L





     (3.5-5.2) 


 


Chloride      112 mEq/L H mEq/L





     () 


 


Carbon Dioxide      20 mEq/l L mEq/l





     (22-31) 


 


Anion Gap      9 mEq/L mEq/L





     (6-14) 


 


BUN      36 mg/dL H mg/dL





     (7-23) 


 


Creatinine      1.6 mg/dL H mg/dL





     (0.7-1.3) 


 


Estimated GFR      41 





    


 


Glucose      167 mg/dL H mg/dL





     () 


 


Calcium      9.3 mg/dL mg/dL





     (8.5-10.4) 


 


POC Troponin I  0.03 ng/mL ng/mL    





   (0.00-0.08)   


 


NT-Pro-B Natriuret Pep    15192 pg/mL H pg/mL  





    (0-450)  














  12/27/18





  13:16


 


WBC  5.57 10^3/uL 10^3/uL





   (3.80-9.50) 


 


RBC  4.06 10^6/uL L 10^6/uL





   (4.40-6.38) 


 


Hgb  11.4 g/dL L g/dL





   (13.7-17.5) 


 


Hct  34.1 % L %





   (40.0-51.0) 


 


MCV  84.0 fL fL





   (81.5-99.8) 


 


MCH  28.1 pg pg





   (27.9-34.1) 


 


MCHC  33.4 g/dL g/dL





   (32.4-36.7) 


 


RDW  14.7 % %





   (11.5-15.2) 


 


Plt Count  203 10^3/uL 10^3/uL





   (150-400) 


 


MPV  9.7 fL fL





   (8.7-11.7) 


 


Neut % (Auto)  63.1 % %





   (39.3-74.2) 


 


Lymph % (Auto)  23.2 % %





   (15.0-45.0) 


 


Mono % (Auto)  9.9 % %





   (4.5-13.0) 


 


Eos % (Auto)  2.7 % %





   (0.6-7.6) 


 


Baso % (Auto)  0.7 % %





   (0.3-1.7) 


 


Nucleat RBC Rel Count  0.0 % %





   (0.0-0.2) 


 


Absolute Neuts (auto)  3.52 10^3/uL 10^3/uL





   (1.70-6.50) 


 


Absolute Lymphs (auto)  1.29 10^3/uL 10^3/uL





   (1.00-3.00) 


 


Absolute Monos (auto)  0.55 10^3/uL 10^3/uL





   (0.30-0.80) 


 


Absolute Eos (auto)  0.15 10^3/uL 10^3/uL





   (0.03-0.40) 


 


Absolute Basos (auto)  0.04 10^3/uL 10^3/uL





   (0.02-0.10) 


 


Absolute Nucleated RBC  0.00 10^3/uL 10^3/uL





   (0-0.01) 


 


Immature Gran %  0.4 % %





   (0.0-1.1) 


 


Immature Gran #  0.02 10^3/uL 10^3/uL





   (0.00-0.10) 


 


Sodium  





  


 


Potassium  





  


 


Chloride  





  


 


Carbon Dioxide  





  


 


Anion Gap  





  


 


BUN  





  


 


Creatinine  





  


 


Estimated GFR  





  


 


Glucose  





  


 


Calcium  





  


 


POC Troponin I  





  


 


NT-Pro-B Natriuret Pep  





  











Point of Care Test Results: 


 Chemistry











  12/27/18





  13:18


 


POC Troponin I  0.03 ng/mL ng/mL





   (0.00-0.08) 














Departure





- Departure


Disposition: [facility]


Clinical Impression: 


 Dyspnea, Diastolic dysfunction





Condition: Good


Referrals: 


Bhavani Gallo MD [Primary Care Provider] - As per Instructions

## 2018-12-28 LAB
INR PPP: 2.02 (ref 0.83–1.16)
PROTHROMBIN TIME: 22.9 SEC (ref 12–15)

## 2018-12-28 RX ADMIN — TACROLIMUS SCH MG: 1 CAPSULE ORAL at 20:35

## 2018-12-28 RX ADMIN — APIXABAN SCH MG: 2.5 TABLET, FILM COATED ORAL at 20:34

## 2018-12-28 RX ADMIN — CYANOCOBALAMIN TAB 1000 MCG SCH MCG: 1000 TAB at 09:36

## 2018-12-28 RX ADMIN — TACROLIMUS SCH MG: 1 CAPSULE ORAL at 09:36

## 2018-12-28 RX ADMIN — PANTOPRAZOLE SODIUM SCH MG: 40 TABLET, DELAYED RELEASE ORAL at 20:35

## 2018-12-28 RX ADMIN — POTASSIUM CHLORIDE SCH MEQ: 750 TABLET, EXTENDED RELEASE ORAL at 20:35

## 2018-12-28 RX ADMIN — LEVOTHYROXINE SODIUM SCH MCG: 0.09 TABLET ORAL at 06:29

## 2018-12-28 RX ADMIN — POLYETHYLENE GLYCOL 3350 SCH GM: 17 POWDER, FOR SOLUTION ORAL at 09:37

## 2018-12-28 RX ADMIN — LISINOPRIL SCH MG: 10 TABLET ORAL at 09:36

## 2018-12-28 RX ADMIN — PANTOPRAZOLE SODIUM SCH MG: 40 TABLET, DELAYED RELEASE ORAL at 09:34

## 2018-12-28 RX ADMIN — METOPROLOL TARTRATE SCH MG: 25 TABLET, FILM COATED ORAL at 09:35

## 2018-12-28 RX ADMIN — FUROSEMIDE SCH MG: 10 INJECTION, SOLUTION INTRAMUSCULAR; INTRAVENOUS at 15:24

## 2018-12-28 RX ADMIN — FLUDROCORTISONE ACETATE SCH MG: 0.1 TABLET ORAL at 09:35

## 2018-12-28 RX ADMIN — POTASSIUM CHLORIDE SCH MEQ: 750 TABLET, EXTENDED RELEASE ORAL at 09:35

## 2018-12-28 RX ADMIN — METOPROLOL TARTRATE SCH MG: 25 TABLET, FILM COATED ORAL at 20:34

## 2018-12-28 NOTE — ASMTCMCOM
CM Note

 

CM Note                       

Notes:

CM reviewed pt's chart for d/c planning.  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 

on 12/17/2018.  He has a hx of chronic AFIB, chronic renal failure, severe aortic stenosis and a 

liver transplant.  Pt lives independently.  He has a son that lives locally.  PT is recommending 

HHC vs SNF, depending on progress.  CM will follow progress for recommendations.



D/C Plan:  C PT/OT vs SNF

 

Date Signed:  12/28/2018 03:59 PM

Electronically Signed By:Nereyda Lui

## 2018-12-28 NOTE — PDCARPN
Cardiology Progress Note


Chief Complaint: 





SOB


Assessment/Plan: 


Assessment:


SOB


diastolic HF


AF























Plan:





12/28/18 07:32


Patient much less SOB today


One more day of IV lasix with KCL


Check labs in AM


Convert to lasix 40 mg po qd/10 meq KCL po qd on 12/29


Reduce lopressor dose


Anticipate d/c on 12/29 with home meds and new addition of lasix/KCL








Subjective: 





feeling better


Reviewed/Discussed With: multidisciplinary team


Time Spent with Patient: greater than 25 minutes


Time Spent with Patient: Greater than 25 minutes spent on this patients care, 

greater than 50% of time spent counseling, educating, and coordinating care 

regarding the above mentioned plan.


Objective: 





 Vital Signs (8 Hrs)











  Temp Pulse Pulse Pulse Pulse Resp BP


 


 12/28/18 04:00  36.3 C  71  74  73  62  15  133/75 H


 


 12/27/18 23:29  36.4 C  66     16  116/55 L














  BP BP BP Pulse Ox


 


 12/28/18 04:00  130/66 H  129/59 H  133/75 H  93


 


 12/27/18 23:29     90 L








 Intake/Output (24 Hrs)











 12/27/18 12/28/18 12/29/18





 05:59 05:59 05:59


 


Intake Total  630 


 


Output Total  1400 


 


Balance  -770 


 


Intake:   


 


  Oral (ml)  630 


 


Output:   


 


  Urine (ml)  1400 


 


    Urinal  1400 


 


Other:   


 


  Weight  62.1 kg 


 


  Number of Voids   


 


    Toilet  1 


 


    Urinal  1 











Result Diagrams: 


 12/27/18 13:16





 12/28/18 04:00


Cardiac Labs: 





 Cardiac Lab Results (72 Hrs)











  12/28/18





  04:00


 


Troponin I  0.026














- Physical Exam


Constitutional: no apparent distress


Eyes: PERRL


Ears, Nose, Mouth, Throat: moist mucous membranes


Cardiovascular: systolic murmur, irregularly irregular


Peripheral Pulses: 1+: femoral (R), femoral (L)


Respiratory: no crackles


Gastrointestinal: normoactive bowel sounds


Genitourinary: no suprapubic tenderness


Skin: no rashes


Musculoskeletal: no muscular tenderness


Neurologic: AAOx3


Psychiatric: cooperative





ICD10 Worksheet


Patient Problems: 


 Problems











Problem Status Onset


 


Diastolic dysfunction Acute  


 


Dyspnea Acute  


 


Anemia due to GI blood loss Acute  


 


Upper GI bleed Acute  


 


Weakness Acute

## 2018-12-29 VITALS — DIASTOLIC BLOOD PRESSURE: 81 MMHG | SYSTOLIC BLOOD PRESSURE: 150 MMHG

## 2018-12-29 LAB
INR PPP: 2.68 (ref 0.83–1.16)
PROTHROMBIN TIME: 28.4 SEC (ref 12–15)

## 2018-12-29 RX ADMIN — FUROSEMIDE SCH MG: 10 INJECTION, SOLUTION INTRAMUSCULAR; INTRAVENOUS at 08:57

## 2018-12-29 RX ADMIN — FUROSEMIDE SCH: 10 INJECTION, SOLUTION INTRAMUSCULAR; INTRAVENOUS at 09:11

## 2018-12-29 RX ADMIN — APIXABAN SCH MG: 2.5 TABLET, FILM COATED ORAL at 08:58

## 2018-12-29 RX ADMIN — TACROLIMUS SCH MG: 1 CAPSULE ORAL at 08:57

## 2018-12-29 RX ADMIN — METOPROLOL TARTRATE SCH MG: 25 TABLET, FILM COATED ORAL at 08:58

## 2018-12-29 RX ADMIN — FLUDROCORTISONE ACETATE SCH MG: 0.1 TABLET ORAL at 08:58

## 2018-12-29 RX ADMIN — LEVOTHYROXINE SODIUM SCH MCG: 0.09 TABLET ORAL at 04:19

## 2018-12-29 RX ADMIN — CYANOCOBALAMIN TAB 1000 MCG SCH MCG: 1000 TAB at 08:58

## 2018-12-29 RX ADMIN — LISINOPRIL SCH: 10 TABLET ORAL at 08:57

## 2018-12-29 RX ADMIN — POTASSIUM CHLORIDE SCH MEQ: 750 TABLET, EXTENDED RELEASE ORAL at 08:59

## 2018-12-29 RX ADMIN — PANTOPRAZOLE SODIUM SCH MG: 40 TABLET, DELAYED RELEASE ORAL at 08:58

## 2018-12-29 RX ADMIN — LISINOPRIL SCH MG: 10 TABLET ORAL at 09:05

## 2018-12-29 RX ADMIN — POLYETHYLENE GLYCOL 3350 SCH GM: 17 POWDER, FOR SOLUTION ORAL at 08:59

## 2018-12-29 NOTE — ASDISCHSUM
----------------------------------------------

Discharge Information

----------------------------------------------

Plan Status:Home with Home Health                    Medically Cleared to Leave:

Discharge Date:                                       D/C Disposition:Home Health Service

[facility] D/C Disposition:                                 Projected Discharge Date:12/29/2018 11:00 AM

Transportation at D/C:Family                         Discharge Delay Reason:

Follow-Up Date:12/29/2018 11:00 AM                   Discharge Slot:

Final Diagnosis:

----------------------------------------------

Placement Information

----------------------------------------------

Referral Type:*Home Health Care Services             Referral ID:[id number]

Provider Name:[facility]

Address 1:[address]                         Phone [phone]

Address 2:                                           Fax [phone]

City:[place]                                          Selection Factors:

State:[place]

 

----------------------------------------------

Patient Contact Information

----------------------------------------------

Contact Name:JOSE J                               Relationship:

Address:                                             Home Phone:

                                                     Work Phone:

City:                                                [facility] Phone:

[facility] Code:                                      Email:

----------------------------------------------

Financial Information

----------------------------------------------

Financial Class:Medicare Advantage Plans

Primary Plan Desc:[employer] GOLD MEDICARE               Primary Plan Number:[id number]

Secondary Plan Desc:                                 Secondary Plan Number:

 

 

----------------------------------------------

Assessment Information

----------------------------------------------

----------------------------------------------

LACE

----------------------------------------------

LACE

 

Length of stay for            Answers:  1 day                                 

current admission                                                             

Comorbidities - select        Answers:  Coronary Artery Disease               

all that apply                                                                

                                        Mild liver or renal                   

                                        disease                               

                                        Other                         Notes:  Liver transplant

# of Emergency department     Answers:  1-2                                   

visits in the last 6                                                          

months                                                                        

Score: 7

 

Date Signed:  12/29/2018 12:45 PM

Electronically Signed By:GM Benitez

 

 

----------------------------------------------

[facility]EN[facility] Progress Note

----------------------------------------------

CM Note

 

CM Note                       

Notes:

CM reviewed pt's chart for d/c planning.  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 

on 12/17/2018.  He has a hx of chronic AFIB, chronic renal failure, severe aortic stenosis and a 

liver transplant.  Pt lives independently.  He has a son that lives locally.  PT is recommending 

HHC vs SNF, depending on progress.  CM will follow progress for recommendations.



D/C Plan:  HHC PT/OT vs SNF

 

Date Signed:  12/28/2018 03:59 PM

Electronically Signed By:Nereyda Lui

 

 

----------------------------------------------

Case Management Discharge Plan Note

----------------------------------------------

Case Management Discharge

 

Discharge Order Complete?     Answers:  Yes                                   

Patient to Obtain             Answers:  via Family                            

Medications                                                                   

Transportation Arranged       Answers:  Family/Friends                        

Faxed Final Orders            Answers:  Yes                                   

Agency/Facility Transfer      Answers:  Yes                                   

Report Printed & Faxed to                                                     

Receiving Agency                                                              

Family Notified               Answers:  Yes                                   

Discharge Comments            

Notes:

CM discussed discharge with pts family and pt. Pt is going to stay with son in [place]. CM 

arranged home PT for pt with [facility]. Pt is going to stay with son Terry at [address] [place] [address] in [place], [facility]. CM submit discharge ppwk. No other CM needs identified. Family to 

transport. 



Plan: home with [facility]. 

 

Date Signed:  12/29/2018 12:44 PM

Electronically Signed By:GM Benitez

 

 

----------------------------------------------

Intervention Information

----------------------------------------------

## 2018-12-29 NOTE — ASMTDCNOTE
Case Management Discharge

 

Discharge Order Complete?     Answers:  Yes                                   

Patient to Obtain             Answers:  via Family                            

Medications                                                                   

Transportation Arranged       Answers:  Family/Friends                        

Faxed Final Orders            Answers:  Yes                                   

Agency/Facility Transfer      Answers:  Yes                                   

Report Printed & Faxed to                                                     

Receiving Agency                                                              

Family Notified               Answers:  Yes                                   

Discharge Comments            

Notes:

CM discussed discharge with pts family and pt. Pt is going to stay with son in [place]. CM 

arranged home PT for pt with [facility]. Pt is going to stay with son Don at [address] [facility] in [place], [facility]. CM submit discharge ppwk. No other CM needs identified. Family to 

transport. 



Plan: home with [facility]. 

 

Date Signed:  12/29/2018 12:44 PM

Electronically Signed By:GM Benitez

## 2018-12-29 NOTE — ASMTLACE
LACE

 

Length of stay for            Answers:  1 day                                 

current admission                                                             

Comorbidities - select        Answers:  Coronary Artery Disease               

all that apply                                                                

                                        Mild liver or renal                   

                                        disease                               

                                        Other                         Notes:  Liver transplant

# of Emergency department     Answers:  1-2                                   

visits in the last 6                                                          

months                                                                        

Score: 7

 

Date Signed:  12/29/2018 12:45 PM

Electronically Signed By:GM Benitez

## 2018-12-29 NOTE — PDIAF
- Diagnosis


Code Status: Do Not Resuscitate





- Medication Management


Discharge Medications: electronically signed and located in the Home Medication 

List.





- Orders


Services needed: Physical Therapy


Diet Recommendation: cardiac -low fat low salt


Additional Instructions: 


1. Please monitor weight on a daily basis, notify [facility] if you gain 

more than 2 lb in a day or 5 lb in a week.


2. Low-sodium diet.


3. Follow-up in warfarin Clinic ([facility] location) on 1/2/2019 at 10:30 

a.m..


4. [facility] will call to make a follow-up appointment for you to be seen 

by Dr. Kamara next week.


5. Please have blood work drawn on January 2nd.





- Follow Up Care


Current Providers and Referrals: 


Keith Kamara MD [Medical Doctor] -  ([facility] will call to schedule a 

follow-up visit to be done next week with Dr. Kamara.)


Bhavani Gallo MD [Primary Care Provider] - As per Instructions

## 2019-02-24 ENCOUNTER — HOSPITAL ENCOUNTER (EMERGENCY)
Dept: HOSPITAL 80 - FED | Age: 84
Discharge: HOME | End: 2019-02-24
Payer: COMMERCIAL

## 2019-02-24 VITALS — DIASTOLIC BLOOD PRESSURE: 63 MMHG | SYSTOLIC BLOOD PRESSURE: 163 MMHG

## 2019-02-24 DIAGNOSIS — E86.0: ICD-10-CM

## 2019-02-24 DIAGNOSIS — F41.0: Primary | ICD-10-CM

## 2019-02-24 LAB
INR PPP: 1.22 (ref 0.83–1.16)
PLATELET # BLD: 205 10^3/UL (ref 150–400)
PROTHROMBIN TIME: 15.6 SEC (ref 12–15)

## 2019-02-24 NOTE — EDPHY
H & P


Stated Complaint: "Anxiety/panic attacks"  "more forgetful over past 2 days or 

so"


Time Seen by Provider: 02/24/19 18:06


HPI/ROS: 





HPI





CHIEF COMPLAINT:  Panic attack, confusion





HISTORY OF PRESENT ILLNESS:  This is a very pleasant 84-year-old male multiple 

chronic medical problems including AFib, coronary disease, hypertension, 

history of liver transplant and a recent history of a TAVR for aortic stenosis, 

presents emergency room by private vehicle with his son for what is described 

his anxiety and panic attacks recently.  The patient states he has been having 

trouble with his finances has been having great deal of anxiety about this.  

His son reports that they noticed over the last 3 weeks he has had some 

increasing confusion after his TAVR.  State he seems to be more forgetful. 


Patient arrived to the emergency room answers my questions appropriately and 

has no focal complaints.  Denies any chest pain or shortness of breath.  Denies 

headache.  Denies focal numbness or tingling or weakness.  He does endorse 

anxiety.  


Patient denies Chest pain, denies sob, denies ha, nausea/vomiting, denies 

dizziness. Main complaint is that he is distressed about his financial. 





Past Medical History:  Significant medical history for AFib, coronary artery 

disease, hypertension, GI bleed, liver transplant, TAVR





Past Surgical History:  Aortic stenosis TAVR, liver transplant





Social History:  Denies drugs alcohol tobacco.  Resides at [facility].





Family History:  Noncontributory








ROS   


REVIEW OF SYSTEMS:


10 Systems were reviewed and negative with the exception of the elements 

mentioned in the history of present illness.








Exam   


Constitutional  elderly, frail, nontoxic triage nursing summary reviewed, vital 

signs reviewed, awake/alert.  Vital signs stable.


Eyes   normal conjunctivae and sclera, EOMI, PERRLA. 


HENT   normal inspection, atraumatic, moist mucus membranes, no epistaxis, neck 

supple/ no meningismus, no raccoon eyes. 


Respiratory   clear to auscultation bilaterally, normal breath sounds, no 

respiratory distress, no wheezing. 


Cardiovascular   rate normal, regular rhythm, no murmur, no edema, distal 

pulses normal. 


Gastrointestinal   soft, non-tender, no rebound, no guarding, normal bowel 

sounds, no distension, no pulsatile mass. 


Genitourinary   no CVA tenderness. 


Musculoskeletal been appearing, no midline vertebral tenderness, full range of 

motion, no calf swelling, no tenderness of extremities, no meningismus, good 

pulses, neurovascularly intact.


Skin   pink, warm, & dry, no rash, skin atraumatic. 


Neurologic   awake, alert and oriented x 3, AAOx3, moves all 4 extremities 

equally, motor intact, sensory intact, CN II-XII intact, normal cerebellar, 

normal vision, normal speech. 


Psychiatric   normal mood/affect. 


Heme/Lymph/Immune   no lymphadenopathy.





Differential Diagnosis:  Includes but is not limited to in a particular order 

dehydration, electrolyte disturbance, intracranial bleed, pneumonia





Medical Decision Making:  Plan for this patient IV establishment, check basic 

blood work, urinalysis, CT scan head without contrast, chest x-ray, point care 

troponin, EKG and re-evaluate.





Re-evaluation:








EKG interpretation by me on record in [facility] system.  Impression time of 

EKG 1833, AFib rate of 63, LVH present.  T-wave inversion lead 3.  Otherwise no 

signs of acute ischemia.  When I compare this EKG to his old EKG 12/27/2018 

very similar morphology.





CT scan head without contrast negative for acute bleed.  Please see full report 

for other nonacute findings at this time.


I did discussed with the son at bedside that there is some mild atrophy, 

additionally some microvascular changes and I do recommend follow-up with 

Neurology and outpatient MRI.  Discussed CT results with the son and patient at 

bedside.





Patient's labs reviewed.  He does not have a elevated white blood cell count, 

his electrolytes are appropriate.


His BNP for heart failure is elevated however much down from his previous 

visit.  He has no signs of heart failure on exam.  He is not short of breath he 

does not have any chest pain, no significant edema.  Chest x-ray is clear and 

stable.





Troponin noted be negative.





EKG is stable from previous EKGs without any signs of ischemia.





The patient did not have any chest pain or shortness of breath.  He has 

otherwise felt well.





He presented to the emergency room for anxiety and concern for confusion.


Here in the emergency room is alert and oriented, in no acute distress.  His 

son is at bedside.





His workup was reviewed with the son as well as the patient and they are 

comfortable going home.  The patient is eager for discharge out of the 

emergency room.


He denies any complaints at this time.  He is neurologically intact.





He has a Neurology appointment for some anxiety confusion that he has been 

dealing with over the past 3 weeks this is scheduled for 2 weeks from now.  I 

do recommend he keep this appointment follow-up.  Also follow up with her 

primary care doctor





I also discussed return precautions they understand if he has further anxiety 

or panic attacks, increasing confusion, or not feeling well or not doing well 

the need to return emergency room they are comfortable this plan.  Additionally 

they are comfortable discharge home.





BUN and creatinine slightly elevated in the pre renal dehydration.  The patient 

did receive IV fluids here 500 cc  IV.


Encouraged him to stay hydrated.  Watch his fluid balance.





Return emergency room worsening symptoms.  





Son at bedside feels comfortable taking him home. Patient feels comfortable 

going home.


Return precautions discussed.  


Source: Patient





- Medical/Surgical History


Hx Asthma: No


Hx Chronic Respiratory Disease: No


Hx Diabetes: No


Hx Cardiac Disease: No


Hx Renal Disease: No


Hx Cirrhosis: No


Hx Alcoholism: No


Hx HIV/AIDS: No


Hx Splenectomy or Spleen Trauma: No


Other PMH: PSH: 2002 liver transplant; prostitectomy; T&A; Appy; maxine;.  PMH: 

htn, TVAR





- Social History


Smoking Status: Never smoked


Constitutional: 


 Initial Vital Signs











Temperature (C)  36.7 C   02/24/19 18:02


 


Heart Rate  89   02/24/19 18:02


 


Respiratory Rate  18   02/24/19 18:02


 


Blood Pressure  144/66 H  02/24/19 18:02


 


O2 Sat (%)  96   02/24/19 18:02








 











O2 Delivery Mode               Room Air














Allergies/Adverse Reactions: 


 





No Known Allergies Allergy (Verified 12/27/18 12:54)


 








Home Medications: 














 Medication  Instructions  Recorded


 


Cyanocobalamin [Vitamin B12 (*)] 1,000 mcg PO DAILY 02/06/17


 


FLUDROCORTISONE ACETATE 0.1 mg PO DAILY 02/06/17


 


Levothyroxine [Synthroid 88 mcg 88 mcg PO DAILY06 02/06/17





(*)]  


 


Tacrolimus [Prograf] 1 mg PO BID 02/06/17


 


Pantoprazole Sodium [Protonix 40mg 40 mg PO BID #60 tab 02/08/17





(*)]  


 


Carbamide Peroxide [Debrox Ear 5 drop EACHEAR BID PRN 11/21/18





drops (*)]  


 


Lisinopril [Zestril 10 mg (*)] 10 mg PO DAILY 11/21/18


 


Polyethylene Glycol 3350 [Miralax 17 gm PO DAILY 11/21/18





17 gm (*)]  


 


amLODIPine BESYLATE [Norvasc 10 mg 10 mg PO DAILY 11/21/18





(*)]  


 


Acetaminophen [Tylenol 325mg (*)] 650 mg PO Q4HRS PRN  tab 12/29/18


 


Furosemide [Lasix 40 MG (*)] 40 mg PO DAILY #30 tab 12/29/18


 


Metoprolol Tartrate [Lopressor 25 25 mg PO BID #60 tab 12/29/18





mg (*)]  


 


Potassium Cl [Klor-Con] 10 meq PO DAILY #30 tab 12/29/18


 


Warfarin Sodium [Coumadin 3MG (*)] 3 mg PO DAILY AT 4PM #30 tab 12/29/18














Medical Decision Making





- Diagnostics


Imaging Results: 


 Imaging Impressions





Chest X-Ray  02/24/19 18:24


Impression:


1. Stable mild cardiomegaly.


2. Fibrotic changes at the lung bases.








Head CT  02/24/19 18:24


Impression:    


1. Stable moderate atrophy.


2. No hemorrhage, mass effect, or definite acute peripheral infarct.


3.  Stable mild nonspecific hypodensities in the white matter of bilateral 

cerebral hemispheres. Differential diagnosis includes microvascular ischemic 

disease, post-infectious/post-inflammatory sequela, atypical demyelinating 

disease, or migraine-related sequela. Small white matter lacunar infarcts may 

also have this appearance.


 


 


If symptoms worsen, additional imaging may be necessary. 


 


Findings discussed with Yvan Molina MD  at  20:05 hour, 2/24/2019.


 


 


 














- Data Points


Laboratory Results: 


 Laboratory Results





 02/24/19 18:32 





 02/24/19 18:32 





 











  02/24/19 02/24/19 02/24/19





  19:40 18:37 18:32


 


WBC      





    


 


RBC      





    


 


Hgb      





    


 


Hct      





    


 


MCV      





    


 


MCH      





    


 


MCHC      





    


 


RDW      





    


 


Plt Count      





    


 


MPV      





    


 


Neut % (Auto)      





    


 


Lymph % (Auto)      





    


 


Mono % (Auto)      





    


 


Eos % (Auto)      





    


 


Baso % (Auto)      





    


 


Nucleat RBC Rel Count      





    


 


Absolute Neuts (auto)      





    


 


Absolute Lymphs (auto)      





    


 


Absolute Monos (auto)      





    


 


Absolute Eos (auto)      





    


 


Absolute Basos (auto)      





    


 


Absolute Nucleated RBC      





    


 


Immature Gran %      





    


 


Immature Gran #      





    


 


PT      





    


 


INR      





    


 


APTT      





    


 


Sodium      141 mEq/L mEq/L





     (135-145) 


 


Potassium      4.6 mEq/L mEq/L





     (3.5-5.2) 


 


Chloride      113 mEq/L H mEq/L





     () 


 


Carbon Dioxide      21 mEq/l L mEq/l





     (22-31) 


 


Anion Gap      7 mEq/L mEq/L





     (6-14) 


 


BUN      32 mg/dL H mg/dL





     (7-23) 


 


Creatinine      1.4 mg/dL H mg/dL





     (0.7-1.3) 


 


Estimated GFR      48 





    


 


Glucose      105 mg/dL H mg/dL





     () 


 


Calcium      9.0 mg/dL mg/dL





     (8.5-10.4) 


 


Magnesium      2.1 mg/dL mg/dL





     (1.6-2.3) 


 


POC Troponin I    0.02 ng/mL ng/mL  





    (0.00-0.08)  


 


NT-Pro-B Natriuret Pep      6440 pg/mL H pg/mL





     (0-450) 


 


Urine Color  PALE YELLOW     





    


 


Urine Appearance  CLEAR     





    


 


Urine pH  5.0     





   (5.0-7.5)   


 


Ur Specific Gravity  1.008     





   (1.002-1.030)   


 


Urine Protein  NEGATIVE     





   (NEGATIVE)   


 


Urine Ketones  NEGATIVE     





   (NEGATIVE)   


 


Urine Blood  1+  H     





   (NEGATIVE)   


 


Urine Nitrate  NEGATIVE     





   (NEGATIVE)   


 


Urine Bilirubin  NEGATIVE     





   (NEGATIVE)   


 


Urine Urobilinogen  NEGATIVE EU EU    





   (0.2-1.0)   


 


Ur Leukocyte Esterase  NEGATIVE     





   (NEGATIVE)   


 


Urine RBC  1-3 /hpf /hpf    





   (0-3)   


 


Urine WBC  1-3 /hpf /hpf    





   (0-3)   


 


Ur Epithelial Cells  TRACE /lpf /lpf    





   (NONE-1+)   


 


Urine Bacteria  TRACE /hpf H /hpf    





   (NONE SEEN)   


 


Hyaline Casts  1-5 /lpf /lpf    





   (0-1)   


 


Urine Mucus  TRACE /lpf /lpf    





   (NONE-1+)   


 


Urine Glucose  NEGATIVE     





   (NEGATIVE)   














  02/24/19 02/24/19





  18:32 18:32


 


WBC    4.78 10^3/uL 10^3/uL





    (3.80-9.50) 


 


RBC    4.01 10^6/uL L 10^6/uL





    (4.40-6.38) 


 


Hgb    11.1 g/dL L g/dL





    (13.7-17.5) 


 


Hct    34.2 % L %





    (40.0-51.0) 


 


MCV    85.3 fL fL





    (81.5-99.8) 


 


MCH    27.7 pg L pg





    (27.9-34.1) 


 


MCHC    32.5 g/dL g/dL





    (32.4-36.7) 


 


RDW    14.1 % %





    (11.5-15.2) 


 


Plt Count    205 10^3/uL 10^3/uL





    (150-400) 


 


MPV    9.3 fL fL





    (8.7-11.7) 


 


Neut % (Auto)    60.5 % %





    (39.3-74.2) 


 


Lymph % (Auto)    20.7 % %





    (15.0-45.0) 


 


Mono % (Auto)    9.8 % %





    (4.5-13.0) 


 


Eos % (Auto)    7.3 % %





    (0.6-7.6) 


 


Baso % (Auto)    1.3 % %





    (0.3-1.7) 


 


Nucleat RBC Rel Count    0.0 % %





    (0.0-0.2) 


 


Absolute Neuts (auto)    2.89 10^3/uL 10^3/uL





    (1.70-6.50) 


 


Absolute Lymphs (auto)    0.99 10^3/uL L 10^3/uL





    (1.00-3.00) 


 


Absolute Monos (auto)    0.47 10^3/uL 10^3/uL





    (0.30-0.80) 


 


Absolute Eos (auto)    0.35 10^3/uL 10^3/uL





    (0.03-0.40) 


 


Absolute Basos (auto)    0.06 10^3/uL 10^3/uL





    (0.02-0.10) 


 


Absolute Nucleated RBC    0.00 10^3/uL 10^3/uL





    (0-0.01) 


 


Immature Gran %    0.4 % %





    (0.0-1.1) 


 


Immature Gran #    0.02 10^3/uL 10^3/uL





    (0.00-0.10) 


 


PT  15.6 SEC H SEC  





   (12.0-15.0)  


 


INR  1.22  H   





   (0.83-1.16)  


 


APTT  32.1 SEC SEC  





   (23.0-38.0)  


 


Sodium    





   


 


Potassium    





   


 


Chloride    





   


 


Carbon Dioxide    





   


 


Anion Gap    





   


 


BUN    





   


 


Creatinine    





   


 


Estimated GFR    





   


 


Glucose    





   


 


Calcium    





   


 


Magnesium    





   


 


POC Troponin I    





   


 


NT-Pro-B Natriuret Pep    





   


 


Urine Color    





   


 


Urine Appearance    





   


 


Urine pH    





   


 


Ur Specific Gravity    





   


 


Urine Protein    





   


 


Urine Ketones    





   


 


Urine Blood    





   


 


Urine Nitrate    





   


 


Urine Bilirubin    





   


 


Urine Urobilinogen    





   


 


Ur Leukocyte Esterase    





   


 


Urine RBC    





   


 


Urine WBC    





   


 


Ur Epithelial Cells    





   


 


Urine Bacteria    





   


 


Hyaline Casts    





   


 


Urine Mucus    





   


 


Urine Glucose    





   











Medications Given: 


 








Discontinued Medications





Sodium Chloride (Ns)  500 mls @ 1,000 mls/hr IV EDNOW ONE


   PRN Reason: Protocol


   Stop: 02/24/19 18:53


   Last Admin: 02/24/19 18:39 Dose:  500 mls





Point of Care Test Results: 


 Chemistry











  02/24/19





  18:37


 


POC Troponin I  0.02 ng/mL ng/mL





   (0.00-0.08) 














Departure





- Departure


Disposition: Home, Routine, Self-Care


Clinical Impression: 


 Anxiety, Dehydration





Condition: Good


Instructions:  Dehydration (ED), Anxiety (ED)


Additional Instructions: 


1. Please follow up with your primary care doctor


2. Please continue your appointment with the neurologist


3. Return to the emergency room if he develops worsening symptoms questions or 

concerns.


4. You were slightly also dehydrated here in the emergency room.  


Referrals: 


Bhavani Gallo MD [Primary Care Provider] - As per Instructions

## 2019-02-24 NOTE — CPEKG
Test Reason : OPEN

Blood Pressure : ***/*** mmHG

Vent. Rate : 063 BPM     Atrial Rate : 089 BPM

   P-R Int : 230 ms          QRS Dur : 088 ms

    QT Int : 400 ms       P-R-T Axes : 000 010 -17 degrees

   QTc Int : 410 ms

 

Atrial fibrillation

Left ventricular hypertrophy

Borderline T abnormalities, inferior leads

 

Confirmed by Yvan Molina ([id number]) on 2/24/2019 7:51:28 PM

 

Referred By: Yvan Molina           Confirmed By:Yvan Molina

## 2019-03-14 ENCOUNTER — HOSPITAL ENCOUNTER (OUTPATIENT)
Dept: HOSPITAL 80 - FIMAGING | Age: 84
End: 2019-03-14
Attending: PHYSICIAN ASSISTANT
Payer: COMMERCIAL

## 2019-03-14 DIAGNOSIS — M54.9: ICD-10-CM

## 2019-03-14 DIAGNOSIS — R41.3: ICD-10-CM

## 2019-03-14 DIAGNOSIS — I38: Primary | ICD-10-CM

## 2019-03-14 PROCEDURE — A9585 GADOBUTROL INJECTION: HCPCS

## 2019-03-14 PROCEDURE — 70553 MRI BRAIN STEM W/O & W/DYE: CPT

## 2019-04-18 ENCOUNTER — HOSPITAL ENCOUNTER (EMERGENCY)
Dept: HOSPITAL 80 - FED | Age: 84
Discharge: HOME | End: 2019-04-18
Payer: COMMERCIAL

## 2019-04-18 VITALS — SYSTOLIC BLOOD PRESSURE: 151 MMHG | DIASTOLIC BLOOD PRESSURE: 70 MMHG

## 2019-04-18 DIAGNOSIS — Z94.4: ICD-10-CM

## 2019-04-18 DIAGNOSIS — F32.9: ICD-10-CM

## 2019-04-18 DIAGNOSIS — Z86.79: ICD-10-CM

## 2019-04-18 DIAGNOSIS — R26.81: Primary | ICD-10-CM

## 2019-04-18 LAB — PLATELET # BLD: 157 10^3/UL (ref 150–400)

## 2019-04-18 NOTE — EDPHY
H & P


Stated Complaint: weakness, balance issues, weight loss


Time Seen by Provider: 04/18/19 11:08


HPI/ROS: 





CHIEF COMPLAINT:  Unsteady gait, anxiety





HISTORY OF PRESENT ILLNESS:  84-year-old male with atrial fibrillation and 

liver transplant presents with unsteady gait and confusion.  He underwent a 

TAVR procedure 2 months ago.  Onset of mild confusion and unsteady gait 

immediately after the procedure.  MRI of the brain was reportedly unremarkable.

  He continues to feel unstable with walking, has not fallen in the last month.

  Uses a cane to ambulate.  Today he developed a panicky feeling and called his 

son.  On his son's arrival, the patient was anxious, which is not typical of 

him.  There were no other new symptoms and the anxiety has resolved.  He has 

felt depressed since the death of his wife.  Not eating or drinking as much as 

usual.  Approximately 20 lb weight loss in the past few months.





REVIEW OF SYSTEMS:  complete 10 point ROS reviewed and is negative except for 

the noted elements in the HPI





Source: Patient





- Personal History


Current Tetanus/Diphtheria Vaccine: Yes


Current Tetanus Diphtheria and Acellular Pertussis (TDAP): Yes





- Medical/Surgical History


Hx Asthma: No


Hx Chronic Respiratory Disease: No


Hx Diabetes: No


Hx Cardiac Disease: Yes


Hx Renal Disease: No


Hx Cirrhosis: No


Hx Alcoholism: No


Hx HIV/AIDS: No


Hx Splenectomy or Spleen Trauma: No


Other PMH: PSH: 2002 liver transplant; prostitectomy; T&A; Appy; maxine;.  PMH: 

htn, TAVR





- Social History


Smoking Status: Never smoked


Alcohol Use: Sober


Drug Use: None


Additional Social History: 





Lives at [facility]





- Physical Exam


Exam: 





General Appearance:  Alert, pleasant and very talkative


Eyes:  Pupils equal and round, no conjunctival pallor or injection


ENT, Mouth:  Mucous membranes moist


Neck:  Normal inspection


Respiratory:  Lungs are clear to auscultation


Cardiovascular:  Irregularly irregular rate and rhythm


Gastrointestinal:  Abdomen is soft and nontender


Neurological:  A&O, motor 5/5, sensory grossly intact


Skin:  Warm and dry


Extremities:  Normal inspection


Psychiatric:  Mood and affect normal





Constitutional: 


 Initial Vital Signs











Temperature (C)  36.7 C   04/18/19 10:19


 


Heart Rate  75   04/18/19 10:19


 


Respiratory Rate  16   04/18/19 10:19


 


Blood Pressure  104/58 L  04/18/19 10:19


 


O2 Sat (%)  96   04/18/19 10:19








 











O2 Delivery Mode               Room Air














Allergies/Adverse Reactions: 


 





No Known Allergies Allergy (Verified 04/18/19 10:18)


 








Home Medications: 














 Medication  Instructions  Recorded


 


Cyanocobalamin [Vitamin B12 (*)] 1,000 mcg PO DAILY 02/06/17


 


FLUDROCORTISONE ACETATE 0.1 mg PO DAILY 02/06/17


 


Levothyroxine [Synthroid 88 mcg 88 mcg PO DAILY06 02/06/17





(*)]  


 


Tacrolimus [Prograf] 1 mg PO BID 02/06/17


 


Pantoprazole Sodium [Protonix 40mg 40 mg PO BID #60 tab 02/08/17





(*)]  


 


Carbamide Peroxide [Debrox Ear 5 drop EACHEAR BID PRN 11/21/18





drops (*)]  


 


Lisinopril [Zestril 10 mg (*)] 10 mg PO DAILY 11/21/18


 


Polyethylene Glycol 3350 [Miralax 17 gm PO DAILY 11/21/18





17 gm (*)]  


 


amLODIPine BESYLATE [Norvasc 10 mg 10 mg PO DAILY 11/21/18





(*)]  


 


Acetaminophen [Tylenol 325mg (*)] 650 mg PO Q4HRS PRN  tab 12/29/18


 


Furosemide [Lasix 40 MG (*)] 40 mg PO DAILY #30 tab 12/29/18


 


Metoprolol Tartrate [Lopressor 25 25 mg PO BID #60 tab 12/29/18





mg (*)]  


 


Potassium Cl [Klor-Con] 10 meq PO DAILY #30 tab 12/29/18


 


Warfarin Sodium [Coumadin 3MG (*)] 3 mg PO DAILY AT 4PM #30 tab 12/29/18














Medical Decision Making





- Diagnostics


EKG Interpretation: 





EKG interpreted by me reveals atrial fibrillation, rate 75, LVH.  Interpretation

:  Abnormal EKG





ED Course/Re-evaluation: 





This patient presents with a 2 month history of unsteady gait, confusion and 

weight loss.  He certainly has element of depression, with recent death of his 

wife.  He denies suicidal ideation.  The only new symptom today is a panicky 

feeling, which has now resolved.  On physical exam, he is not ill appearing and 

he is afebrile.  History of liver transplant, fortunately no evidence of 

infection.  Old medical record reviewed and the brain MRI and March 2019 was 

unremarkable.  Discussion with the patient and the son, option for admission 

for physical therapy and occupational therapy.  They decline admission.  The 

patient is already undergoing physical therapy at home.  Will obtain labs and 

EKG.  Will also have the patient walk to ensure that ambulate chin is at 

baseline.





Patient is able to walk with a steady gait, at his baseline according to the 

patient and his son.  Will discharge the patient home.  Strongly encouraged 

further outpatient evaluation, including evaluation for depression.  Will 

continue physical therapy for ongoing gait disturbance.  Warning signs 

discussed.


Differential Diagnosis: 





Altered mental status including but not limited to hypoglycemia, infectious 

process, electrolyte abnormality, head injury, CVA, and intoxicants.





- Data Points


Laboratory Results: 


 Laboratory Results





 04/18/19 11:32 





 04/18/19 11:32 





 











  04/18/19 04/18/19 04/18/19





  12:51 11:50 11:32


 


WBC      





    


 


RBC      





    


 


Hgb      





    


 


Hct      





    


 


MCV      





    


 


MCH      





    


 


MCHC      





    


 


RDW      





    


 


Plt Count      





    


 


MPV      





    


 


Neut % (Auto)      





    


 


Lymph % (Auto)      





    


 


Mono % (Auto)      





    


 


Eos % (Auto)      





    


 


Baso % (Auto)      





    


 


Nucleat RBC Rel Count      





    


 


Absolute Neuts (auto)      





    


 


Absolute Lymphs (auto)      





    


 


Absolute Monos (auto)      





    


 


Absolute Eos (auto)      





    


 


Absolute Basos (auto)      





    


 


Absolute Nucleated RBC      





    


 


Immature Gran %      





    


 


Immature Gran #      





    


 


Sodium      142 mEq/L mEq/L





     (135-145) 


 


Potassium      3.9 mEq/L mEq/L





     (3.5-5.2) 


 


Chloride      110 mEq/L mEq/L





     () 


 


Carbon Dioxide      24 mEq/l mEq/l





     (22-31) 


 


Anion Gap      8 mEq/L mEq/L





     (6-14) 


 


BUN      23 mg/dL mg/dL





     (7-23) 


 


Creatinine      1.1 mg/dL mg/dL





     (0.7-1.3) 


 


Estimated GFR      > 60 





    


 


Glucose      110 mg/dL H mg/dL





     () 


 


Calcium      9.5 mg/dL mg/dL





     (8.5-10.4) 


 


Total Bilirubin      0.8 mg/dL mg/dL





     (0.1-1.4) 


 


Conjugated Bilirubin      0.4 mg/dL mg/dL





     (0.0-0.5) 


 


Unconjugated Bilirubin      0.4 mg/dL mg/dL





     (0.0-1.1) 


 


AST      40 IU/L IU/L





     (17-59) 


 


ALT      45 IU/L IU/L





     (21-72) 


 


Alkaline Phosphatase      162 IU/L H IU/L





     () 


 


POC Troponin I  0.00 ng/mL ng/mL    





   (0.00-0.08)   


 


Total Protein      7.0 g/dL g/dL





     (6.3-8.2) 


 


Albumin      3.9 g/dL g/dL





     (3.5-5.0) 


 


Lipase      23 IU/L IU/L





     () 


 


Urine Color    YELLOW   





    


 


Urine Appearance    HAZY   





    


 


Urine pH    5.0   





    (5.0-7.5)  


 


Ur Specific Gravity    1.018   





    (1.002-1.030)  


 


Urine Protein    3+  H   





    (NEGATIVE)  


 


Urine Ketones    NEGATIVE   





    (NEGATIVE)  


 


Urine Blood    2+  H   





    (NEGATIVE)  


 


Urine Nitrate    NEGATIVE   





    (NEGATIVE)  


 


Urine Bilirubin    NEGATIVE   





    (NEGATIVE)  


 


Urine Urobilinogen    NEGATIVE EU EU  





    (0.2-1.0)  


 


Ur Leukocyte Esterase    NEGATIVE   





    (NEGATIVE)  


 


Urine RBC    3-5 /hpf H /hpf  





    (0-3)  


 


Urine WBC    1-3 /hpf /hpf  





    (0-3)  


 


Ur Epithelial Cells    NONE SEEN /lpf /lpf  





    (NONE-1+)  


 


Hyaline Casts    15-25 /lpf H /lpf  





    (0-1)  


 


Granular Casts    5-15 /lpf /lpf  





    (0-1)  


 


Urine Mucus    TRACE /lpf /lpf  





    (NONE-1+)  


 


Urine Glucose    NEGATIVE   





    (NEGATIVE)  














  04/18/19





  11:32


 


WBC  5.00 10^3/uL 10^3/uL





   (3.80-9.50) 


 


RBC  4.50 10^6/uL 10^6/uL





   (4.40-6.38) 


 


Hgb  12.4 g/dL L g/dL





   (13.7-17.5) 


 


Hct  37.5 % L %





   (40.0-51.0) 


 


MCV  83.3 fL fL





   (81.5-99.8) 


 


MCH  27.6 pg L pg





   (27.9-34.1) 


 


MCHC  33.1 g/dL g/dL





   (32.4-36.7) 


 


RDW  15.1 % %





   (11.5-15.2) 


 


Plt Count  157 10^3/uL 10^3/uL





   (150-400) 


 


MPV  9.9 fL fL





   (8.7-11.7) 


 


Neut % (Auto)  62.8 % %





   (39.3-74.2) 


 


Lymph % (Auto)  23.2 % %





   (15.0-45.0) 


 


Mono % (Auto)  9.0 % %





   (4.5-13.0) 


 


Eos % (Auto)  3.0 % %





   (0.6-7.6) 


 


Baso % (Auto)  1.2 % %





   (0.3-1.7) 


 


Nucleat RBC Rel Count  0.0 % %





   (0.0-0.2) 


 


Absolute Neuts (auto)  3.14 10^3/uL 10^3/uL





   (1.70-6.50) 


 


Absolute Lymphs (auto)  1.16 10^3/uL 10^3/uL





   (1.00-3.00) 


 


Absolute Monos (auto)  0.45 10^3/uL 10^3/uL





   (0.30-0.80) 


 


Absolute Eos (auto)  0.15 10^3/uL 10^3/uL





   (0.03-0.40) 


 


Absolute Basos (auto)  0.06 10^3/uL 10^3/uL





   (0.02-0.10) 


 


Absolute Nucleated RBC  0.00 10^3/uL 10^3/uL





   (0-0.01) 


 


Immature Gran %  0.8 % %





   (0.0-1.1) 


 


Immature Gran #  0.04 10^3/uL 10^3/uL





   (0.00-0.10) 


 


Sodium  





  


 


Potassium  





  


 


Chloride  





  


 


Carbon Dioxide  





  


 


Anion Gap  





  


 


BUN  





  


 


Creatinine  





  


 


Estimated GFR  





  


 


Glucose  





  


 


Calcium  





  


 


Total Bilirubin  





  


 


Conjugated Bilirubin  





  


 


Unconjugated Bilirubin  





  


 


AST  





  


 


ALT  





  


 


Alkaline Phosphatase  





  


 


POC Troponin I  





  


 


Total Protein  





  


 


Albumin  





  


 


Lipase  





  


 


Urine Color  





  


 


Urine Appearance  





  


 


Urine pH  





  


 


Ur Specific Gravity  





  


 


Urine Protein  





  


 


Urine Ketones  





  


 


Urine Blood  





  


 


Urine Nitrate  





  


 


Urine Bilirubin  





  


 


Urine Urobilinogen  





  


 


Ur Leukocyte Esterase  





  


 


Urine RBC  





  


 


Urine WBC  





  


 


Ur Epithelial Cells  





  


 


Hyaline Casts  





  


 


Granular Casts  





  


 


Urine Mucus  





  


 


Urine Glucose  





  











Point of Care Test Results: 


 Chemistry











  04/18/19





  12:51


 


POC Troponin I  0.00 ng/mL ng/mL





   (0.00-0.08) 














Departure





- Departure


Disposition: Home, Routine, Self-Care


Clinical Impression: 


 Unsteady gait, Depression





Condition: Good


Instructions:  Depression (ED), Fall Prevention for Older Adults (ED)


Referrals: 


Bhavani Gallo MD [Primary Care Provider] - 2-3 days without fail

## 2021-06-15 NOTE — CPEKG
Duplicate message, refill request encounter already entered.    Test Reason : OPEN

Blood Pressure : ***/*** mmHG

Vent. Rate : 075 BPM     Atrial Rate : 000 BPM

   P-R Int : 170 ms          QRS Dur : 095 ms

    QT Int : 451 ms       P-R-T Axes : 000 -06 -27 degrees

   QTc Int : 504 ms

 

Atrial fibrillation

Probable left ventricular hypertrophy

Anterior Q waves, possibly due to LVH

Borderline T abnormalities, inferior leads

Prolonged QT interval

 

Confirmed by Suze David ([id number]) on 4/18/2019 2:52:02 PM

 

Referred By: Suze David           Confirmed By:Suze David